# Patient Record
Sex: FEMALE | Race: WHITE | NOT HISPANIC OR LATINO
[De-identification: names, ages, dates, MRNs, and addresses within clinical notes are randomized per-mention and may not be internally consistent; named-entity substitution may affect disease eponyms.]

---

## 2018-11-15 ENCOUNTER — RECORD ABSTRACTING (OUTPATIENT)
Age: 43
End: 2018-11-15

## 2018-11-15 DIAGNOSIS — E06.3 OTHER SPECIFIED HYPOTHYROIDISM: ICD-10-CM

## 2018-11-15 DIAGNOSIS — R14.0 ABDOMINAL DISTENSION (GASEOUS): ICD-10-CM

## 2018-11-15 DIAGNOSIS — Z82.49 FAMILY HISTORY OF ISCHEMIC HEART DISEASE AND OTHER DISEASES OF THE CIRCULATORY SYSTEM: ICD-10-CM

## 2018-11-15 DIAGNOSIS — Z86.19 PERSONAL HISTORY OF OTHER INFECTIOUS AND PARASITIC DISEASES: ICD-10-CM

## 2018-11-15 DIAGNOSIS — F41.8 OTHER SPECIFIED ANXIETY DISORDERS: ICD-10-CM

## 2018-11-15 DIAGNOSIS — E03.8 OTHER SPECIFIED HYPOTHYROIDISM: ICD-10-CM

## 2018-11-15 DIAGNOSIS — G89.29 OTHER CHRONIC PAIN: ICD-10-CM

## 2018-11-15 DIAGNOSIS — G56.00 CARPAL TUNNEL SYNDROME, UNSPECIFIED UPPER LIMB: ICD-10-CM

## 2018-11-15 DIAGNOSIS — Z87.891 PERSONAL HISTORY OF NICOTINE DEPENDENCE: ICD-10-CM

## 2018-11-15 DIAGNOSIS — Z87.39 PERSONAL HISTORY OF OTHER DISEASES OF THE MUSCULOSKELETAL SYSTEM AND CONNECTIVE TISSUE: ICD-10-CM

## 2018-11-15 DIAGNOSIS — Z80.9 FAMILY HISTORY OF MALIGNANT NEOPLASM, UNSPECIFIED: ICD-10-CM

## 2018-11-15 DIAGNOSIS — G43.009 MIGRAINE W/OUT AURA, NOT INTRACTABLE, W/OUT STATUS MIGRAINOSUS: ICD-10-CM

## 2018-11-15 DIAGNOSIS — Z78.9 OTHER SPECIFIED HEALTH STATUS: ICD-10-CM

## 2018-11-15 DIAGNOSIS — M54.40 LUMBAGO WITH SCIATICA, UNSPECIFIED SIDE: ICD-10-CM

## 2018-11-15 DIAGNOSIS — Z81.8 FAMILY HISTORY OF OTHER MENTAL AND BEHAVIORAL DISORDERS: ICD-10-CM

## 2018-11-15 DIAGNOSIS — Z83.3 FAMILY HISTORY OF DIABETES MELLITUS: ICD-10-CM

## 2018-12-13 ENCOUNTER — APPOINTMENT (OUTPATIENT)
Dept: ENDOCRINOLOGY | Facility: CLINIC | Age: 43
End: 2018-12-13

## 2019-02-19 ENCOUNTER — APPOINTMENT (OUTPATIENT)
Dept: INTERNAL MEDICINE | Facility: CLINIC | Age: 44
End: 2019-02-19
Payer: COMMERCIAL

## 2019-02-19 VITALS — BODY MASS INDEX: 39.05 KG/M2 | HEIGHT: 66 IN | WEIGHT: 243 LBS | TEMPERATURE: 98 F

## 2019-02-19 PROCEDURE — 99213 OFFICE O/P EST LOW 20 MIN: CPT

## 2019-02-20 VITALS — DIASTOLIC BLOOD PRESSURE: 70 MMHG | SYSTOLIC BLOOD PRESSURE: 120 MMHG

## 2019-02-20 NOTE — HISTORY OF PRESENT ILLNESS
[FreeTextEntry8] : Patient is a 43 F with a hx of hashimoto's thyroiditis, last seen by Dr. Prasad 8/2018, thyroid nodules, lumbar facet arthropathy presents today due to facial swelling. Reports that she was having dental work done yesterday and while the dentist was drilling she started to have acute pain followed by facial and eye swelling. She was placed on Benadryl which patient reports has helped the swelling. She is not itchy. There is no fever. \par She reports feeling popping under the skin when she touches the side of her face and neck.

## 2019-02-20 NOTE — PHYSICAL EXAM
[No Acute Distress] : no acute distress [Well Nourished] : well nourished [Well Developed] : well developed [No Respiratory Distress] : no respiratory distress  [Clear to Auscultation] : lungs were clear to auscultation bilaterally [No Accessory Muscle Use] : no accessory muscle use [Normal Rate] : normal rate  [Regular Rhythm] : with a regular rhythm [Normal S1, S2] : normal S1 and S2 [No Murmur] : no murmur heard [de-identified] : right eye with periorbital swelling [de-identified] : right facial swelling. There is popping on palpation of the cheek and neck on the right side

## 2019-03-28 ENCOUNTER — INBOUND DOCUMENT (OUTPATIENT)
Age: 44
End: 2019-03-28

## 2019-07-30 ENCOUNTER — APPOINTMENT (OUTPATIENT)
Dept: INTERNAL MEDICINE | Facility: CLINIC | Age: 44
End: 2019-07-30
Payer: COMMERCIAL

## 2019-07-30 VITALS — TEMPERATURE: 98 F | HEIGHT: 66 IN | WEIGHT: 248 LBS | BODY MASS INDEX: 39.86 KG/M2

## 2019-07-30 PROCEDURE — 99214 OFFICE O/P EST MOD 30 MIN: CPT

## 2019-07-31 VITALS — SYSTOLIC BLOOD PRESSURE: 140 MMHG | DIASTOLIC BLOOD PRESSURE: 90 MMHG

## 2019-07-31 NOTE — HISTORY OF PRESENT ILLNESS
[FreeTextEntry1] : headache [de-identified] : Keyon is a 43F with a hx of HTN, hypothyroidism, Migraines, thyroid nodule, presents today with headache x 2 weeks. \chaitanya Describes the pain as shooting and goes from the back of the head to the front on the right side\chaitanya Saw her dentist 2 weeks who did xrays and does not feel it is dental\chaitanya Has a hx of migraines that started at 37 yo and then resolved for the last several years  -  per patient MRI brain had inflammation seen by neurologist who said she was ok.\par Currently her pain is moderate to severe- the shooting is for a few seconds, but the underlying pain can remain all day. \par There is no nausea or vomiting. No photo/phono sensitivity.\par NSAIDs are not currently helping. \par \par \par

## 2019-07-31 NOTE — REVIEW OF SYSTEMS
[Headache] : headache [Negative] : Heme/Lymph [Vision Problems] : no vision problems [Dizziness] : no dizziness [Confusion] : no confusion

## 2019-08-09 ENCOUNTER — OTHER (OUTPATIENT)
Age: 44
End: 2019-08-09

## 2019-08-13 ENCOUNTER — OTHER (OUTPATIENT)
Age: 44
End: 2019-08-13

## 2019-08-13 DIAGNOSIS — R51 HEADACHE: ICD-10-CM

## 2019-08-14 ENCOUNTER — APPOINTMENT (OUTPATIENT)
Dept: INTERNAL MEDICINE | Facility: CLINIC | Age: 44
End: 2019-08-14
Payer: COMMERCIAL

## 2019-08-14 VITALS — SYSTOLIC BLOOD PRESSURE: 120 MMHG | DIASTOLIC BLOOD PRESSURE: 70 MMHG

## 2019-08-14 VITALS — TEMPERATURE: 98 F | WEIGHT: 248 LBS | BODY MASS INDEX: 39.86 KG/M2 | HEIGHT: 66 IN

## 2019-08-14 PROCEDURE — 99214 OFFICE O/P EST MOD 30 MIN: CPT

## 2019-08-14 NOTE — HISTORY OF PRESENT ILLNESS
[de-identified] : Keyon is a 43F with a hx of HTN, hypothyroidism, Migraines, thyroid nodule, presents today for hypertension follow up.\chaitanya Teague has completed the ambulatory BP monitoring and was found to be Stage 2 hypertensive. I put her on Lisinopril 10mg daily. \chaitanya Feeling well without side effects. headaches are still present [FreeTextEntry1] : follow up

## 2019-08-18 ENCOUNTER — RESULT REVIEW (OUTPATIENT)
Age: 44
End: 2019-08-18

## 2019-08-19 ENCOUNTER — RX RENEWAL (OUTPATIENT)
Age: 44
End: 2019-08-19

## 2019-08-20 ENCOUNTER — APPOINTMENT (OUTPATIENT)
Dept: INTERNAL MEDICINE | Facility: CLINIC | Age: 44
End: 2019-08-20

## 2019-08-27 ENCOUNTER — RX RENEWAL (OUTPATIENT)
Age: 44
End: 2019-08-27

## 2019-11-07 ENCOUNTER — APPOINTMENT (OUTPATIENT)
Dept: ENDOCRINOLOGY | Facility: CLINIC | Age: 44
End: 2019-11-07
Payer: COMMERCIAL

## 2019-11-07 VITALS
SYSTOLIC BLOOD PRESSURE: 110 MMHG | OXYGEN SATURATION: 98 % | WEIGHT: 246 LBS | BODY MASS INDEX: 39.53 KG/M2 | HEIGHT: 66 IN | HEART RATE: 60 BPM | DIASTOLIC BLOOD PRESSURE: 80 MMHG

## 2019-11-07 LAB
25(OH)D3 SERPL-MCNC: 20.4 NG/ML
BASOPHILS # BLD AUTO: 0.04 K/UL
BASOPHILS NFR BLD AUTO: 0.6 %
EOSINOPHIL # BLD AUTO: 0.07 K/UL
EOSINOPHIL NFR BLD AUTO: 1.1 %
HCT VFR BLD CALC: 41.3 %
HGB BLD-MCNC: 13.2 G/DL
IMM GRANULOCYTES NFR BLD AUTO: 0.2 %
LYMPHOCYTES # BLD AUTO: 2.11 K/UL
LYMPHOCYTES NFR BLD AUTO: 34.3 %
MAN DIFF?: NORMAL
MCHC RBC-ENTMCNC: 27.3 PG
MCHC RBC-ENTMCNC: 32 GM/DL
MCV RBC AUTO: 85.3 FL
MONOCYTES # BLD AUTO: 0.58 K/UL
MONOCYTES NFR BLD AUTO: 9.4 %
NEUTROPHILS # BLD AUTO: 3.35 K/UL
NEUTROPHILS NFR BLD AUTO: 54.4 %
PLATELET # BLD AUTO: 369 K/UL
RBC # BLD: 4.84 M/UL
RBC # FLD: 15.2 %
T3 SERPL-MCNC: 154 NG/DL
T4 FREE SERPL-MCNC: 1 NG/DL
TSH SERPL-ACNC: 1.63 UIU/ML
VIT B12 SERPL-MCNC: 359 PG/ML
WBC # FLD AUTO: 6.16 K/UL

## 2019-11-07 PROCEDURE — 99214 OFFICE O/P EST MOD 30 MIN: CPT | Mod: 25

## 2019-11-07 RX ORDER — NALTREXONE HYDROCHLORIDE AND BUPROPION HYDROCHLORIDE 8; 90 MG/1; MG/1
8-90 TABLET, EXTENDED RELEASE ORAL
Refills: 0 | Status: DISCONTINUED | COMMUNITY
End: 2019-11-07

## 2019-11-07 RX ORDER — CYCLOBENZAPRINE HYDROCHLORIDE 10 MG/1
10 TABLET, FILM COATED ORAL
Refills: 0 | Status: DISCONTINUED | COMMUNITY
End: 2019-11-07

## 2019-11-07 RX ORDER — FLUTICASONE PROPIONATE 50 UG/1
50 SPRAY, METERED NASAL TWICE DAILY
Refills: 0 | Status: DISCONTINUED | COMMUNITY
End: 2019-11-07

## 2019-11-07 RX ORDER — LIRAGLUTIDE 6 MG/ML
18 INJECTION, SOLUTION SUBCUTANEOUS
Refills: 0 | Status: DISCONTINUED | COMMUNITY
End: 2019-11-07

## 2019-11-07 RX ORDER — NAPROXEN 500 MG/1
500 TABLET ORAL
Qty: 30 | Refills: 0 | Status: DISCONTINUED | COMMUNITY
Start: 2019-02-11 | End: 2019-11-07

## 2019-11-07 RX ORDER — CHROMIUM 200 MCG
TABLET ORAL
Refills: 0 | Status: DISCONTINUED | COMMUNITY
End: 2019-11-07

## 2019-11-07 RX ORDER — LIRAGLUTIDE 6 MG/ML
18 INJECTION SUBCUTANEOUS
Refills: 0 | Status: DISCONTINUED | COMMUNITY
End: 2019-11-07

## 2019-11-07 RX ORDER — ERGOCALCIFEROL 1.25 MG/1
1.25 MG CAPSULE ORAL
Refills: 0 | Status: DISCONTINUED | COMMUNITY
End: 2019-11-07

## 2019-11-07 RX ORDER — SUMATRIPTAN SUCCINATE 100 MG/1
100 TABLET, FILM COATED ORAL
Refills: 0 | Status: DISCONTINUED | COMMUNITY
End: 2019-11-07

## 2019-11-07 RX ORDER — TOBRAMYCIN 3 MG/ML
0.3 SOLUTION/ DROPS OPHTHALMIC
Qty: 5 | Refills: 0 | Status: DISCONTINUED | COMMUNITY
Start: 2019-02-10 | End: 2019-11-07

## 2019-11-07 NOTE — PHYSICAL EXAM
[No Acute Distress] : no acute distress [Alert] : alert [Well Nourished] : well nourished [Well Developed] : well developed [Normal Sclera/Conjunctiva] : normal sclera/conjunctiva [EOMI] : extra ocular movement intact [Normal Oropharynx] : the oropharynx was normal [No Proptosis] : no proptosis [No Respiratory Distress] : no respiratory distress [No Thyroid Nodules] : there were no palpable thyroid nodules [Clear to Auscultation] : lungs were clear to auscultation bilaterally [No Accessory Muscle Use] : no accessory muscle use [Normal Rate] : heart rate was normal  [Regular Rhythm] : with a regular rhythm [Normal S1, S2] : normal S1 and S2 [Pedal Pulses Normal] : the pedal pulses are present [Normal Bowel Sounds] : normal bowel sounds [No Edema] : there was no peripheral edema [Not Tender] : non-tender [Soft] : abdomen soft [Not Distended] : not distended [Post Cervical Nodes] : posterior cervical nodes [Anterior Cervical Nodes] : anterior cervical nodes [Normal] : normal and non tender [Axillary Nodes] : axillary nodes [No Spinal Tenderness] : no spinal tenderness [Spine Straight] : spine straight [Normal Gait] : normal gait [No Stigmata of Cushings Syndrome] : no stigmata of cushings syndrome [Normal Strength/Tone] : muscle strength and tone were normal [No Rash] : no rash [Normal Reflexes] : deep tendon reflexes were 2+ and symmetric [No Tremors] : no tremors [Oriented x3] : oriented to person, place, and time [de-identified] : mild thyromegaly [Acanthosis Nigricans] : no acanthosis nigricans

## 2019-11-07 NOTE — PHYSICAL EXAM
[Alert] : alert [No Acute Distress] : no acute distress [Well Developed] : well developed [Well Nourished] : well nourished [EOMI] : extra ocular movement intact [Normal Sclera/Conjunctiva] : normal sclera/conjunctiva [Normal Oropharynx] : the oropharynx was normal [No Proptosis] : no proptosis [No Thyroid Nodules] : there were no palpable thyroid nodules [No Respiratory Distress] : no respiratory distress [Normal Rate] : heart rate was normal  [Clear to Auscultation] : lungs were clear to auscultation bilaterally [No Accessory Muscle Use] : no accessory muscle use [Normal S1, S2] : normal S1 and S2 [Regular Rhythm] : with a regular rhythm [No Edema] : there was no peripheral edema [Normal Bowel Sounds] : normal bowel sounds [Pedal Pulses Normal] : the pedal pulses are present [Not Tender] : non-tender [Soft] : abdomen soft [Not Distended] : not distended [Post Cervical Nodes] : posterior cervical nodes [Anterior Cervical Nodes] : anterior cervical nodes [No Spinal Tenderness] : no spinal tenderness [Normal] : normal and non tender [Axillary Nodes] : axillary nodes [Normal Gait] : normal gait [Spine Straight] : spine straight [No Stigmata of Cushings Syndrome] : no stigmata of cushings syndrome [Normal Strength/Tone] : muscle strength and tone were normal [No Rash] : no rash [No Tremors] : no tremors [Normal Reflexes] : deep tendon reflexes were 2+ and symmetric [Oriented x3] : oriented to person, place, and time [Acanthosis Nigricans] : no acanthosis nigricans [de-identified] : mild thyromegaly

## 2019-11-07 NOTE — HISTORY OF PRESENT ILLNESS
[FreeTextEntry1] :   Endo- Dr. Douglas ([facility])-4-5 yrs\par        41 yo WW f/u for hypothyroidism, Hashimoto thyroiditis, thyroid nodules, did not do her US yet\par        "I have been dieting since I was a kid" , seen Nutritionist "I know what to do" \par        has been on Synthroid LINDA 100mcg qd takes it correctly , more energy since [place] 75mg qd , dose increased 8/17\par        denies Fhx thyroid cancer\par        denies neck irradiation\par        denies dysphagia, \par        denies dyspnea\par        denies dysphonia \par        7/6/17 right thyroid nodule hypoechoic 1cm FNA negative for malignancy \par        Dr Horton tried to get for her Victoza, Saxenda, eather covered by her insurance unless she develops type 2 DM2 and she fails Metformin.

## 2019-11-07 NOTE — HISTORY OF PRESENT ILLNESS
[FreeTextEntry1] :   Endo- Dr. Douglas ([facility])-4-5 yrs\par        41 yo WW f/u for hypothyroidism, Hashimoto thyroiditis, thyroid nodules, did not do her US yet\par        "I have been dieting since I was a kid" , seen Nutritionist "I know what to do" \par        has been on Synthroid LINDA 100mcg qd takes it correctly , more energy since [place] 75mg qd , dose increased 8/17\par        denies Fhx thyroid cancer\par        denies neck irradiation\par        denies dysphagia, \par        denies dyspnea\par        denies dysphonia \par        7/6/17 right thyroid nodule hypoechoic 1cm FNA negative for malignancy \par        Dr Horton tried to get for her Victoza, Saxenda, eather covered by her insurance unless she develops type 2 DM2 and she fails Metformin.

## 2019-11-07 NOTE — REVIEW OF SYSTEMS
[Fatigue] : fatigue [Gas/Bloating] : gas/bloating [Joint Pain] : joint pain [Constipation] : constipation [Depression] : depression [Back Pain] : back pain [Headache] : headaches [Anxiety] : anxiety [Stress] : stress [Insomnia] : insomnia [Negative] : Heme/Lymph

## 2020-03-02 ENCOUNTER — APPOINTMENT (OUTPATIENT)
Dept: INTERNAL MEDICINE | Facility: CLINIC | Age: 45
End: 2020-03-02
Payer: COMMERCIAL

## 2020-03-02 VITALS — BODY MASS INDEX: 40.18 KG/M2 | TEMPERATURE: 98 F | WEIGHT: 250 LBS | HEIGHT: 66 IN

## 2020-03-02 VITALS — DIASTOLIC BLOOD PRESSURE: 60 MMHG | SYSTOLIC BLOOD PRESSURE: 108 MMHG

## 2020-03-02 PROCEDURE — 36415 COLL VENOUS BLD VENIPUNCTURE: CPT

## 2020-03-02 PROCEDURE — 99396 PREV VISIT EST AGE 40-64: CPT | Mod: 25

## 2020-03-02 NOTE — PHYSICAL EXAM
[No Acute Distress] : no acute distress [Well-Appearing] : well-appearing [Well Nourished] : well nourished [Well Developed] : well developed [Normal Sclera/Conjunctiva] : normal sclera/conjunctiva [PERRL] : pupils equal round and reactive to light [EOMI] : extraocular movements intact [Normal Oropharynx] : the oropharynx was normal [No Lymphadenopathy] : no lymphadenopathy [Normal Outer Ear/Nose] : the outer ears and nose were normal in appearance [No Respiratory Distress] : no respiratory distress  [Supple] : supple [Thyroid Normal, No Nodules] : the thyroid was normal and there were no nodules present [Clear to Auscultation] : lungs were clear to auscultation bilaterally [No Accessory Muscle Use] : no accessory muscle use [Normal Rate] : normal rate  [Regular Rhythm] : with a regular rhythm [Normal S1, S2] : normal S1 and S2 [No Abdominal Bruit] : a ~M bruit was not heard ~T in the abdomen [No Murmur] : no murmur heard [No Varicosities] : no varicosities [Pedal Pulses Present] : the pedal pulses are present [No Edema] : there was no peripheral edema [No Palpable Aorta] : no palpable aorta [Non Tender] : non-tender [No Extremity Clubbing/Cyanosis] : no extremity clubbing/cyanosis [Soft] : abdomen soft [No HSM] : no HSM [Non-distended] : non-distended [No Masses] : no abdominal mass palpated [Normal Posterior Cervical Nodes] : no posterior cervical lymphadenopathy [Normal Bowel Sounds] : normal bowel sounds [Normal Anterior Cervical Nodes] : no anterior cervical lymphadenopathy [Grossly Normal Strength/Tone] : grossly normal strength/tone [No Joint Swelling] : no joint swelling [No Focal Deficits] : no focal deficits [Coordination Grossly Intact] : coordination grossly intact [No Rash] : no rash [Normal Affect] : the affect was normal [Normal Gait] : normal gait [Normal Insight/Judgement] : insight and judgment were intact [de-identified] : limited internal and external rotation of the hips worse on the left

## 2020-03-02 NOTE — HEALTH RISK ASSESSMENT
[30 or more] : 30 or more [Yes] : Yes [2 - 3 times a week (3 pts)] : 2 - 3  times a week (3 points) [1 or 2 (0 pts)] : 1 or 2 (0 points) [Never (0 pts)] : Never (0 points) [No] : In the past 12 months have you used drugs other than those required for medical reasons? No [No falls in past year] : Patient reported no falls in the past year [0] : 1) Little interest or pleasure doing things: Not at all (0) [1] : 2) Feeling down, depressed, or hopeless for several days (1) [HIV Test offered] : HIV Test offered [Hepatitis C test offered] : Hepatitis C test offered [With Family] : lives with family [# of Members in Household ___] :  household currently consist of [unfilled] member(s) [Employed] : employed [Graduate School] : graduate school [] :  [Feels Safe at Home] : Feels safe at home [# Of Children ___] : has [unfilled] children [Reports changes in vision] : Reports changes in vision [Reports normal functional visual acuity (ie: able to read med bottle)] : Reports normal functional visual acuity [Smoke Detector] : smoke detector [Carbon Monoxide Detector] : carbon monoxide detector [Guns at Home] : guns at home [Seat Belt] :  uses seat belt [Sunscreen] : uses sunscreen [Patient/Caregiver not ready to engage] : Patient/Caregiver not ready to engage [Patient reported mammogram was normal] : Patient reported mammogram was normal [Patient reported PAP Smear was normal] : Patient reported PAP Smear was normal [] : No [YearQuit] : 2005 [Audit-CScore] : 3 [de-identified] : socially  [[id number]] : 1 [de-identified] : not at time w/ hip pain [de-identified] : Pt. 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

## 2020-03-02 NOTE — HISTORY OF PRESENT ILLNESS
[de-identified] : Patiient is a 43F with a hx of HTN, hypothyroidism, Migraines, thyroid nodule, presents today for annual exam\par Getting dry cough with lisinopril\par Has arthritis of left hip - worse for the last year, difficulty walking. Also with left knee and lower back pain

## 2020-03-06 LAB
25(OH)D3 SERPL-MCNC: 28.9 NG/ML
ALBUMIN SERPL ELPH-MCNC: 4.1 G/DL
ALP BLD-CCNC: 64 U/L
ALT SERPL-CCNC: 20 U/L
ANION GAP SERPL CALC-SCNC: 18 MMOL/L
AST SERPL-CCNC: 19 U/L
BASOPHILS # BLD AUTO: 0.05 K/UL
BASOPHILS NFR BLD AUTO: 0.8 %
BILIRUB SERPL-MCNC: 0.3 MG/DL
BUN SERPL-MCNC: 11 MG/DL
CALCIUM SERPL-MCNC: 9.6 MG/DL
CHLORIDE SERPL-SCNC: 102 MMOL/L
CHOLEST SERPL-MCNC: 218 MG/DL
CHOLEST/HDLC SERPL: 4.3 RATIO
CO2 SERPL-SCNC: 20 MMOL/L
CREAT SERPL-MCNC: 0.75 MG/DL
EOSINOPHIL # BLD AUTO: 0.08 K/UL
EOSINOPHIL NFR BLD AUTO: 1.3 %
ESTIMATED AVERAGE GLUCOSE: 114 MG/DL
GLUCOSE SERPL-MCNC: 89 MG/DL
HBA1C MFR BLD HPLC: 5.6 %
HCT VFR BLD CALC: 40.9 %
HCV AB SER QL: NONREACTIVE
HCV S/CO RATIO: 0.42 S/CO
HDLC SERPL-MCNC: 50 MG/DL
HGB BLD-MCNC: 12.9 G/DL
HIV1+2 AB SPEC QL IA.RAPID: NONREACTIVE
IMM GRANULOCYTES NFR BLD AUTO: 0.2 %
LDLC SERPL CALC-MCNC: 137 MG/DL
LYMPHOCYTES # BLD AUTO: 1.94 K/UL
LYMPHOCYTES NFR BLD AUTO: 31.9 %
MAN DIFF?: NORMAL
MCHC RBC-ENTMCNC: 27.6 PG
MCHC RBC-ENTMCNC: 31.5 GM/DL
MCV RBC AUTO: 87.4 FL
MONOCYTES # BLD AUTO: 0.6 K/UL
MONOCYTES NFR BLD AUTO: 9.9 %
NEUTROPHILS # BLD AUTO: 3.41 K/UL
NEUTROPHILS NFR BLD AUTO: 55.9 %
PLATELET # BLD AUTO: 338 K/UL
POTASSIUM SERPL-SCNC: 4.5 MMOL/L
PROT SERPL-MCNC: 7.1 G/DL
RBC # BLD: 4.68 M/UL
RBC # FLD: 14.8 %
SODIUM SERPL-SCNC: 140 MMOL/L
TRIGL SERPL-MCNC: 152 MG/DL
TSH SERPL-ACNC: 3.56 UIU/ML
WBC # FLD AUTO: 6.09 K/UL

## 2020-03-18 ENCOUNTER — APPOINTMENT (OUTPATIENT)
Dept: OBGYN | Facility: CLINIC | Age: 45
End: 2020-03-18

## 2020-04-06 ENCOUNTER — APPOINTMENT (OUTPATIENT)
Dept: INTERNAL MEDICINE | Facility: CLINIC | Age: 45
End: 2020-04-06
Payer: COMMERCIAL

## 2020-04-06 VITALS — SYSTOLIC BLOOD PRESSURE: 140 MMHG | DIASTOLIC BLOOD PRESSURE: 80 MMHG

## 2020-04-06 PROCEDURE — 99213 OFFICE O/P EST LOW 20 MIN: CPT

## 2020-04-06 RX ORDER — HYDROCODONE BITARTRATE AND ACETAMINOPHEN 7.5; 3 MG/1; MG/1
7.5-3 TABLET ORAL
Qty: 16 | Refills: 0 | Status: DISCONTINUED | COMMUNITY
Start: 2019-10-17

## 2020-04-06 RX ORDER — AMOXICILLIN AND CLAVULANATE POTASSIUM 500; 125 MG/1; MG/1
500-125 TABLET, FILM COATED ORAL
Qty: 30 | Refills: 0 | Status: DISCONTINUED | COMMUNITY
Start: 2019-10-14

## 2020-04-06 NOTE — HISTORY OF PRESENT ILLNESS
[FreeTextEntry1] : BP follow up [de-identified] : Patiient is a 43F with a hx of HTN, hypothyroidism, Migraines, thyroid nodule, presents today for annual exam\par now on Losartan 25mg\par Getting pressure headaches\par Does not have BP cuff to take BP at home

## 2020-04-06 NOTE — PHYSICAL EXAM
[Normal Sclera/Conjunctiva] : normal sclera/conjunctiva [No Edema] : there was no peripheral edema [No Rash] : no rash [Normal] : affect was normal and insight and judgment were intact

## 2020-04-15 ENCOUNTER — RX RENEWAL (OUTPATIENT)
Age: 45
End: 2020-04-15

## 2020-06-04 ENCOUNTER — RX RENEWAL (OUTPATIENT)
Age: 45
End: 2020-06-04

## 2020-06-25 ENCOUNTER — APPOINTMENT (OUTPATIENT)
Dept: ENDOCRINOLOGY | Facility: CLINIC | Age: 45
End: 2020-06-25
Payer: COMMERCIAL

## 2020-06-25 VITALS — BODY MASS INDEX: 38.41 KG/M2 | HEIGHT: 66 IN | WEIGHT: 239 LBS

## 2020-06-25 LAB
25(OH)D3 SERPL-MCNC: 27.3 NG/ML
T3 SERPL-MCNC: 158 NG/DL
T4 FREE SERPL-MCNC: 0.8 NG/DL
TSH SERPL-ACNC: 1.87 UIU/ML

## 2020-06-25 PROCEDURE — 99214 OFFICE O/P EST MOD 30 MIN: CPT | Mod: 95

## 2020-06-25 RX ORDER — LISINOPRIL 10 MG/1
10 TABLET ORAL DAILY
Qty: 90 | Refills: 1 | Status: DISCONTINUED | COMMUNITY
Start: 2019-08-09 | End: 2020-06-25

## 2020-06-25 NOTE — HISTORY OF PRESENT ILLNESS
[Home] : at home, [unfilled] , at the time of the visit. [Verbal consent obtained from patient] : the patient, [unfilled] [Medical Office: ([facility])___] : at the medical office located in  [FreeTextEntry1] :   Endo- Dr. Douglas ([facility])-4-5 yrs\par        45 yo WW f/u for hypothyroidism, Hashimoto thyroiditis, thyroid nodules, did not do her US yet this year, last one \par  her mother  few month ago \par        has been on Synthroid LINDA 100mcg qd takes it correctly , more energy since [place] 90mg qd labs reviewed good \par        denies Fhx thyroid cancer\par        denies neck irradiation\par        denies dysphagia, \par        denies dyspnea\par        denies dysphonia \par        17 right thyroid nodule hypoechoic 1cm FNA negative for malignancy \par        Dr Horton tried to get for her Victoza, Saxenda, either covered by her insurance unless she develops type 2 DM2 and she fails Metformin.\par \par started weight watchers doing well loosing weight

## 2020-06-25 NOTE — REVIEW OF SYSTEMS
[FreeTextEntry1] : Constitutional: fatigue. weight loss on weight watchers 11lb \par Gastrointestinal: constipation and gas/bloating. \par Musculoskeletal: joint pain and back pain. \par Neurological: headaches. \par Psychiatric: depression, anxiety, insomnia and stress. \par Constitutional, Eyes, ENT, Cardiovascular, Respiratory, Gastrointestinal, Genitourinary, Musculoskeletal, Integumentary, Neurological, Psychiatric, Endocrine and Heme/Lymph are otherwise negative.

## 2020-07-13 ENCOUNTER — RX RENEWAL (OUTPATIENT)
Age: 45
End: 2020-07-13

## 2020-07-15 ENCOUNTER — APPOINTMENT (OUTPATIENT)
Dept: OBGYN | Facility: CLINIC | Age: 45
End: 2020-07-15
Payer: COMMERCIAL

## 2020-07-15 VITALS
DIASTOLIC BLOOD PRESSURE: 82 MMHG | SYSTOLIC BLOOD PRESSURE: 116 MMHG | TEMPERATURE: 98.4 F | WEIGHT: 239 LBS | BODY MASS INDEX: 38.41 KG/M2 | HEIGHT: 66 IN | HEART RATE: 76 BPM | OXYGEN SATURATION: 99 %

## 2020-07-15 DIAGNOSIS — Z01.419 ENCOUNTER FOR GYNECOLOGICAL EXAMINATION (GENERAL) (ROUTINE) W/OUT ABNORMAL FINDINGS: ICD-10-CM

## 2020-07-15 DIAGNOSIS — Z12.4 ENCOUNTER FOR SCREENING FOR MALIGNANT NEOPLASM OF CERVIX: ICD-10-CM

## 2020-07-15 DIAGNOSIS — Z12.39 ENCOUNTER FOR OTHER SCREENING FOR MALIGNANT NEOPLASM OF BREAST: ICD-10-CM

## 2020-07-15 PROCEDURE — 99396 PREV VISIT EST AGE 40-64: CPT

## 2020-07-15 NOTE — PHYSICAL EXAM
[Awake] : awake [Acute Distress] : no acute distress [Alert] : alert [LAD] : no lymphadenopathy [Goiter] : no goiter [Mass] : no breast mass [Thyroid Nodule] : no thyroid nodule [Nipple Discharge] : no nipple discharge [Tender] : non tender [Axillary LAD] : no axillary lymphadenopathy [H/Smegaly] : no hepatosplenomegaly [Oriented x3] : oriented to person, place, and time [Distended] : not distended [No Lesions] : no genitalia lesions [Depressed Mood] : not depressed [Flat Affect] : affect not flat [Labia Minora Erythema] : no erythema of the labia minora [Labia Majora Erythema] : no erythema of the labia majora [Labia Minora] : labia minora [Normal] : uterus [Labia Majora] : labia major [Pink Rugae] : pink rugae [Cystocele] : a cystocele [No Bleeding] : there was no active vaginal bleeding [Pap Obtained] : a Pap smear was not performed [Motion Tenderness] : there was no cervical motion tenderness [Soft] :  the cervix was not soft [Tenderness] : nontender [Normal Position] : in a normal position [Mass ___ cm] : no uterine mass was palpated [Uterine Adnexae] : were not tender and not enlarged [Adnexa Tenderness] : were not tender [Enlarged ___ wks] : not enlarged [FreeTextEntry9] : deferred

## 2020-07-15 NOTE — REVIEW OF SYSTEMS
[Feeling Tired] : not feeling tired [Chills] : no chills [Chest Pain] : no chest pain [Palpitations] : no palpitations [Dyspnea] : no shortness of breath [Constipation] : no constipation [Abdominal Pain] : no abdominal pain [Cough] : no cough [Diarrhea] : no diarrhea [Pelvic Pain] : no pelvic pain [Dysuria] : no dysuria [Incontinence] : incontinence [Urgency] : no urgency [Increased Time Between Periods] : increased time between periods [Frequency] : no frequency [Arthralgias] : no arthralgias [Joint Pain] : no joint pain [Skin Lesions] : no skin lesions [Breast Pain] : no breast pain [Joint Stiffness] : no joint stiffness [Dizziness] : no dizziness [Headache] : no headache [Hot Flashes] : hot flashes [Deepening Voice] : no deepening of the voice [Feeling Weak] : no feelings of weakness [Nl] : Integumentary

## 2020-07-15 NOTE — DISCUSSION/SUMMARY
[FreeTextEntry1] : Annual exam\par pap with HR HPV\par dense breasts bilaterally - mammo and breast ultrasound for screening\par \par Stress incontinence\par REviewed options ranging from Pelvic Pt to surgical intervention\par Plan is for pelvic PT\par \par Answered all questions\par \par Meredith Valerio MD, PhD\par

## 2020-07-17 LAB — HPV HIGH+LOW RISK DNA PNL CVX: NOT DETECTED

## 2020-07-21 ENCOUNTER — RESULT REVIEW (OUTPATIENT)
Age: 45
End: 2020-07-21

## 2020-07-21 LAB — CYTOLOGY CVX/VAG DOC THIN PREP: NORMAL

## 2020-08-17 ENCOUNTER — RX RENEWAL (OUTPATIENT)
Age: 45
End: 2020-08-17

## 2020-09-29 ENCOUNTER — APPOINTMENT (OUTPATIENT)
Dept: ENDOCRINOLOGY | Facility: CLINIC | Age: 45
End: 2020-09-29
Payer: COMMERCIAL

## 2020-09-29 VITALS — HEIGHT: 66 IN | BODY MASS INDEX: 38.41 KG/M2 | WEIGHT: 239 LBS

## 2020-09-29 LAB
25(OH)D3 SERPL-MCNC: 29.2 NG/ML
T3 SERPL-MCNC: 108 NG/DL
T4 FREE SERPL-MCNC: 0.9 NG/DL
TSH SERPL-ACNC: 1.74 UIU/ML

## 2020-09-29 PROCEDURE — 99214 OFFICE O/P EST MOD 30 MIN: CPT | Mod: 25,95

## 2020-09-29 PROCEDURE — G0447 BEHAVIOR COUNSEL OBESITY 15M: CPT | Mod: 95

## 2020-09-29 RX ORDER — ERGOCALCIFEROL 1.25 MG/1
1.25 MG CAPSULE ORAL
Qty: 13 | Refills: 1 | Status: DISCONTINUED | COMMUNITY
Start: 2019-11-07 | End: 2020-09-29

## 2020-09-29 NOTE — REVIEW OF SYSTEMS
[Fatigue] : fatigue [Joint Pain] : joint pain [Back Pain] : back pain [Headaches] : headaches [Insomnia] : insomnia [Anxiety] : anxiety [Negative] : Heme/Lymph [Depression] : no depression [Stress] : no stress

## 2020-09-29 NOTE — HISTORY OF PRESENT ILLNESS
[Home] : at home, [unfilled] , at the time of the visit. [Medical Office: ([facility])___] : at the medical office located in  [Verbal consent obtained from patient] : the patient, [unfilled] [FreeTextEntry1] : prior   Endo- Dr. Douglas ([facility])-4-5 yrs\par        46 yo WW f/u for hypothyroidism, Hashimoto thyroiditis, thyroid nodules, \par \par        has been on Synthroid LINDA 100mcg qd takes it correctly , more energy since [place] 90mg qd labs reviewed good but still tired \par has had hip pain did not sleep well for the last two nights \par        denies Fhx thyroid cancer\par        denies neck irradiation\par        denies dysphagia, \par        denies dyspnea\par        denies dysphonia \par        7/6/17 right thyroid nodule hypoechoic 1cm FNA negative for malignancy \par        Dr Horton tried to get for her Victoza, Saxenda, either covered by her insurance unless she develops type 2 DM2 and she fails Metformin.\par \par started weight watchers doing well loosing weight

## 2020-10-12 ENCOUNTER — RX RENEWAL (OUTPATIENT)
Age: 45
End: 2020-10-12

## 2020-10-20 ENCOUNTER — RX RENEWAL (OUTPATIENT)
Age: 45
End: 2020-10-20

## 2020-12-23 PROBLEM — Z01.419 ENCOUNTER FOR ANNUAL ROUTINE GYNECOLOGICAL EXAMINATION: Status: RESOLVED | Noted: 2020-07-15 | Resolved: 2020-12-23

## 2021-01-04 ENCOUNTER — APPOINTMENT (OUTPATIENT)
Dept: ENDOCRINOLOGY | Facility: CLINIC | Age: 46
End: 2021-01-04
Payer: COMMERCIAL

## 2021-01-04 VITALS
DIASTOLIC BLOOD PRESSURE: 76 MMHG | HEIGHT: 66 IN | WEIGHT: 244 LBS | BODY MASS INDEX: 39.21 KG/M2 | OXYGEN SATURATION: 98 % | SYSTOLIC BLOOD PRESSURE: 112 MMHG | TEMPERATURE: 97.8 F | HEART RATE: 80 BPM

## 2021-01-04 DIAGNOSIS — R63.5 ABNORMAL WEIGHT GAIN: ICD-10-CM

## 2021-01-04 LAB
25(OH)D3 SERPL-MCNC: 25 NG/ML
FOLATE SERPL-MCNC: 14.2 NG/ML
T3 SERPL-MCNC: 148 NG/DL
T4 FREE SERPL-MCNC: 0.9 NG/DL
TSH SERPL-ACNC: 3.84 UIU/ML
VIT B12 SERPL-MCNC: 279 PG/ML

## 2021-01-04 PROCEDURE — G0447 BEHAVIOR COUNSEL OBESITY 15M: CPT

## 2021-01-04 PROCEDURE — 99214 OFFICE O/P EST MOD 30 MIN: CPT | Mod: 25

## 2021-01-04 PROCEDURE — 99072 ADDL SUPL MATRL&STAF TM PHE: CPT

## 2021-01-04 RX ORDER — LOSARTAN POTASSIUM 25 MG/1
25 TABLET, FILM COATED ORAL
Qty: 30 | Refills: 2 | Status: DISCONTINUED | COMMUNITY
Start: 2020-10-20 | End: 2021-01-04

## 2021-01-04 NOTE — REVIEW OF SYSTEMS
[Fatigue] : fatigue [Joint Pain] : joint pain [Back Pain] : back pain [Headaches] : headaches [Insomnia] : insomnia [Anxiety] : anxiety [Negative] : Heme/Lymph [Recent Weight Gain (___ Lbs)] : recent weight gain: [unfilled] lbs [Depression] : no depression [Stress] : no stress

## 2021-01-04 NOTE — HISTORY OF PRESENT ILLNESS
[FreeTextEntry1] : prior   [facility]\par        44 yo WW f/u for hypothyroidism, Hashimoto thyroiditis, thyroid nodules, \par \par       used to be  on Synthroid LINDA 100mcg qd takes it correctly , more energy since [place] 90mg qd labs reviewed good but still tired \par has had hip pain did not sleep well for the last two nights \par        denies Fhx thyroid cancer\par        denies neck irradiation\par        denies dysphagia, \par        denies dyspnea\par        denies dysphonia \par        7/6/17 right thyroid nodule hypoechoic 1cm FNA negative for malignancy \par        Dr Horton tried to get for her Victoza, Saxenda, either covered by her insurance unless she develops type 2 DM2 and she fails Metformin.\par \par started weight watchers doing well loosing weight

## 2021-01-09 ENCOUNTER — TRANSCRIPTION ENCOUNTER (OUTPATIENT)
Age: 46
End: 2021-01-09

## 2021-01-13 ENCOUNTER — APPOINTMENT (OUTPATIENT)
Dept: FAMILY MEDICINE | Facility: CLINIC | Age: 46
End: 2021-01-13
Payer: COMMERCIAL

## 2021-01-13 VITALS
DIASTOLIC BLOOD PRESSURE: 80 MMHG | WEIGHT: 240 LBS | TEMPERATURE: 97.9 F | BODY MASS INDEX: 38.57 KG/M2 | HEIGHT: 66 IN | SYSTOLIC BLOOD PRESSURE: 120 MMHG

## 2021-01-13 VITALS — DIASTOLIC BLOOD PRESSURE: 82 MMHG | SYSTOLIC BLOOD PRESSURE: 128 MMHG

## 2021-01-13 PROCEDURE — G0442 ANNUAL ALCOHOL SCREEN 15 MIN: CPT

## 2021-01-13 PROCEDURE — 99212 OFFICE O/P EST SF 10 MIN: CPT

## 2021-01-13 PROCEDURE — 99072 ADDL SUPL MATRL&STAF TM PHE: CPT

## 2021-01-13 NOTE — HISTORY OF PRESENT ILLNESS
[FreeTextEntry8] : Ms. RIVAS HERRING is a 45 year old female here today for follow-up on her BP. Wasn't feeling well over the weekend and she checked her BP and it was high. Went to  and they said her BP was also high and was told to f/u with her PCP. \par

## 2021-01-13 NOTE — HEALTH RISK ASSESSMENT
[2 - 4 times a month (2 pts)] : 2-4 times a month (2 points) [1 or 2 (0 pts)] : 1 or 2 (0 points) [Never (0 pts)] : Never (0 points) [No] : In the past 12 months have you used drugs other than those required for medical reasons? No [No falls in past year] : Patient reported no falls in the past year [0] : 2) Feeling down, depressed, or hopeless: Not at all (0) [] : No [Yes] : Yes [Audit-CScore] : 2 [de-identified] : walking [de-identified] : weight watchers

## 2021-01-22 ENCOUNTER — APPOINTMENT (OUTPATIENT)
Dept: ENDOCRINOLOGY | Facility: CLINIC | Age: 46
End: 2021-01-22
Payer: COMMERCIAL

## 2021-01-22 PROCEDURE — 99214 OFFICE O/P EST MOD 30 MIN: CPT | Mod: 95,25

## 2021-01-22 PROCEDURE — G0447 BEHAVIOR COUNSEL OBESITY 15M: CPT | Mod: 95

## 2021-01-27 ENCOUNTER — RX RENEWAL (OUTPATIENT)
Age: 46
End: 2021-01-27

## 2021-02-07 NOTE — REVIEW OF SYSTEMS
[Fatigue] : fatigue [Recent Weight Gain (___ Lbs)] : recent weight gain: [unfilled] lbs [Joint Pain] : joint pain [Back Pain] : back pain [Headaches] : headaches [Insomnia] : insomnia [Anxiety] : anxiety [Negative] : Heme/Lymph [Depression] : no depression [Stress] : no stress

## 2021-02-07 NOTE — HISTORY OF PRESENT ILLNESS
[FreeTextEntry1] : prior   [facility]\par        46 yo WW f/u for hypothyroidism, Hashimoto thyroiditis, thyroid nodules, \par \par       used to be  on Synthroid LINDA 100mcg qd takes it correctly , more energy since [place] 90mg qd labs reviewed good but still tired \par has had hip pain did not sleep well for the last two nights \par        denies Fhx thyroid cancer\par        denies neck irradiation\par        denies dysphagia, \par        denies dyspnea\par        denies dysphonia \par        7/6/17 right thyroid nodule hypoechoic 1cm FNA negative for malignancy \par        Dr Horton tried to get for her Victoza, Saxenda, either covered by her insurance unless she develops type 2 DM2 and she fails Metformin.\par \par started weight watchers doing well loosing weight

## 2021-02-10 ENCOUNTER — RX RENEWAL (OUTPATIENT)
Age: 46
End: 2021-02-10

## 2021-02-26 ENCOUNTER — NON-APPOINTMENT (OUTPATIENT)
Age: 46
End: 2021-02-26

## 2021-02-26 ENCOUNTER — APPOINTMENT (OUTPATIENT)
Dept: RHEUMATOLOGY | Facility: CLINIC | Age: 46
End: 2021-02-26
Payer: COMMERCIAL

## 2021-02-26 VITALS
HEIGHT: 66 IN | TEMPERATURE: 97.8 F | DIASTOLIC BLOOD PRESSURE: 78 MMHG | BODY MASS INDEX: 39.05 KG/M2 | SYSTOLIC BLOOD PRESSURE: 126 MMHG | WEIGHT: 243 LBS

## 2021-02-26 DIAGNOSIS — Z86.19 PERSONAL HISTORY OF OTHER INFECTIOUS AND PARASITIC DISEASES: ICD-10-CM

## 2021-02-26 DIAGNOSIS — T81.82XA: ICD-10-CM

## 2021-02-26 DIAGNOSIS — R22.0 LOCALIZED SWELLING, MASS AND LUMP, HEAD: ICD-10-CM

## 2021-02-26 PROCEDURE — 99072 ADDL SUPL MATRL&STAF TM PHE: CPT

## 2021-02-26 PROCEDURE — 99205 OFFICE O/P NEW HI 60 MIN: CPT

## 2021-02-26 NOTE — REASON FOR VISIT
[Initial Evaluation] : an initial evaluation [FreeTextEntry1] : possible Raynaud's; multiple joint pain

## 2021-02-26 NOTE — PHYSICAL EXAM
[Sclera] : the sclera and conjunctiva were normal [] : no respiratory distress [Auscultation Breath Sounds / Voice Sounds] : lungs were clear to auscultation bilaterally [Cervical Lymph Nodes Enlarged Posterior Bilaterally] : posterior cervical [Cervical Lymph Nodes Enlarged Anterior Bilaterally] : anterior cervical [Motor Tone] : muscle strength and tone were normal [FreeTextEntry1] : There is minimal digital pulp atrophy

## 2021-02-26 NOTE — REVIEW OF SYSTEMS
[Joint Pain] : joint pain [Joint Stiffness] : joint stiffness [Fever] : no fever [Chills] : no chills [Eye Pain] : no eye pain [Red Eyes] : eyes not red [Shortness Of Breath] : no shortness of breath [Cough] : no cough [Abdominal Pain] : no abdominal pain [Diarrhea] : no diarrhea [Dysuria] : no dysuria [Joint Swelling] : no joint swelling [Skin Lesions] : no skin lesions [FreeTextEntry6] : There is no pleuritic C/P

## 2021-03-01 LAB
ALBUMIN SERPL ELPH-MCNC: 4.1 G/DL
ALP BLD-CCNC: 67 U/L
ALT SERPL-CCNC: 34 U/L
ANION GAP SERPL CALC-SCNC: 13 MMOL/L
AST SERPL-CCNC: 26 U/L
BASOPHILS # BLD AUTO: 0.04 K/UL
BASOPHILS NFR BLD AUTO: 0.8 %
BILIRUB SERPL-MCNC: 0.2 MG/DL
BUN SERPL-MCNC: 16 MG/DL
CALCIUM SERPL-MCNC: 9.6 MG/DL
CCP AB SER IA-ACNC: <8 UNITS
CHLORIDE SERPL-SCNC: 101 MMOL/L
CO2 SERPL-SCNC: 23 MMOL/L
CREAT SERPL-MCNC: 0.73 MG/DL
CRP SERPL-MCNC: 0.25 MG/DL
DSDNA AB SER-ACNC: <12 IU/ML
ENA RNP AB SER IA-ACNC: 0.2 AL
ENA SM AB SER IA-ACNC: <0.2 AL
ENA SS-A AB SER IA-ACNC: <0.2 AL
ENA SS-B AB SER IA-ACNC: <0.2 AL
EOSINOPHIL # BLD AUTO: 0.06 K/UL
EOSINOPHIL NFR BLD AUTO: 1.2 %
ERYTHROCYTE [SEDIMENTATION RATE] IN BLOOD BY WESTERGREN METHOD: 44 MM/HR
GLUCOSE SERPL-MCNC: 85 MG/DL
HCT VFR BLD CALC: 41.1 %
HGB BLD-MCNC: 13.2 G/DL
IMM GRANULOCYTES NFR BLD AUTO: 0.2 %
LYMPHOCYTES # BLD AUTO: 2.05 K/UL
LYMPHOCYTES NFR BLD AUTO: 40.2 %
MAN DIFF?: NORMAL
MCHC RBC-ENTMCNC: 27.6 PG
MCHC RBC-ENTMCNC: 32.1 GM/DL
MCV RBC AUTO: 85.8 FL
MONOCYTES # BLD AUTO: 0.48 K/UL
MONOCYTES NFR BLD AUTO: 9.4 %
NEUTROPHILS # BLD AUTO: 2.46 K/UL
NEUTROPHILS NFR BLD AUTO: 48.2 %
PLATELET # BLD AUTO: 282 K/UL
POTASSIUM SERPL-SCNC: 4.3 MMOL/L
PROT SERPL-MCNC: 7.2 G/DL
RBC # BLD: 4.79 M/UL
RBC # FLD: 14 %
RF+CCP IGG SER-IMP: NEGATIVE
RHEUMATOID FACT SER QL: <10 IU/ML
SODIUM SERPL-SCNC: 138 MMOL/L
WBC # FLD AUTO: 5.1 K/UL

## 2021-03-02 LAB — HISTONE AB SER QL: 0.4 UNITS

## 2021-03-04 LAB — ANA SER IF-ACNC: NEGATIVE

## 2021-03-17 ENCOUNTER — APPOINTMENT (OUTPATIENT)
Dept: RHEUMATOLOGY | Facility: CLINIC | Age: 46
End: 2021-03-17
Payer: COMMERCIAL

## 2021-03-17 VITALS
BODY MASS INDEX: 38.89 KG/M2 | SYSTOLIC BLOOD PRESSURE: 118 MMHG | DIASTOLIC BLOOD PRESSURE: 72 MMHG | WEIGHT: 242 LBS | HEIGHT: 66 IN | TEMPERATURE: 98.1 F

## 2021-03-17 DIAGNOSIS — R76.11 NONSPECIFIC REACTION TO TUBERCULIN SKIN TEST W/OUT ACTIVE TUBERCULOSIS: ICD-10-CM

## 2021-03-17 PROCEDURE — 99214 OFFICE O/P EST MOD 30 MIN: CPT

## 2021-03-17 PROCEDURE — 99072 ADDL SUPL MATRL&STAF TM PHE: CPT

## 2021-03-17 RX ORDER — METHYLPREDNISOLONE 4 MG/1
4 TABLET ORAL
Qty: 1 | Refills: 0 | Status: DISCONTINUED | COMMUNITY
Start: 2021-02-26 | End: 2021-03-17

## 2021-03-18 LAB
HBV CORE IGG+IGM SER QL: NONREACTIVE
HCV AB SER QL: NONREACTIVE
HCV S/CO RATIO: 0.11 S/CO

## 2021-03-22 LAB
M TB IFN-G BLD-IMP: NEGATIVE
QUANTIFERON TB PLUS MITOGEN MINUS NIL: 9.81 IU/ML
QUANTIFERON TB PLUS NIL: 0.02 IU/ML
QUANTIFERON TB PLUS TB1 MINUS NIL: 0 IU/ML
QUANTIFERON TB PLUS TB2 MINUS NIL: 0 IU/ML

## 2021-04-14 ENCOUNTER — APPOINTMENT (OUTPATIENT)
Dept: INTERNAL MEDICINE | Facility: CLINIC | Age: 46
End: 2021-04-14

## 2021-05-19 ENCOUNTER — APPOINTMENT (OUTPATIENT)
Dept: INTERNAL MEDICINE | Facility: CLINIC | Age: 46
End: 2021-05-19
Payer: COMMERCIAL

## 2021-05-19 PROCEDURE — 36415 COLL VENOUS BLD VENIPUNCTURE: CPT

## 2021-05-19 PROCEDURE — 99072 ADDL SUPL MATRL&STAF TM PHE: CPT

## 2021-05-20 LAB
ALBUMIN SERPL ELPH-MCNC: 4.1 G/DL
ALP BLD-CCNC: 66 U/L
ALT SERPL-CCNC: 20 U/L
ANION GAP SERPL CALC-SCNC: 11 MMOL/L
AST SERPL-CCNC: 21 U/L
BASOPHILS # BLD AUTO: 0.04 K/UL
BASOPHILS NFR BLD AUTO: 0.7 %
BILIRUB SERPL-MCNC: 0.3 MG/DL
BUN SERPL-MCNC: 10 MG/DL
CALCIUM SERPL-MCNC: 9.7 MG/DL
CHLORIDE SERPL-SCNC: 100 MMOL/L
CO2 SERPL-SCNC: 25 MMOL/L
CREAT SERPL-MCNC: 0.74 MG/DL
EOSINOPHIL # BLD AUTO: 0.06 K/UL
EOSINOPHIL NFR BLD AUTO: 1 %
ERYTHROCYTE [SEDIMENTATION RATE] IN BLOOD BY WESTERGREN METHOD: 42 MM/HR
GLUCOSE SERPL-MCNC: 80 MG/DL
HCT VFR BLD CALC: 42 %
HGB BLD-MCNC: 13.3 G/DL
IMM GRANULOCYTES NFR BLD AUTO: 0.2 %
LYMPHOCYTES # BLD AUTO: 1.97 K/UL
LYMPHOCYTES NFR BLD AUTO: 32 %
MAN DIFF?: NORMAL
MCHC RBC-ENTMCNC: 27.7 PG
MCHC RBC-ENTMCNC: 31.7 GM/DL
MCV RBC AUTO: 87.5 FL
MONOCYTES # BLD AUTO: 0.63 K/UL
MONOCYTES NFR BLD AUTO: 10.2 %
NEUTROPHILS # BLD AUTO: 3.44 K/UL
NEUTROPHILS NFR BLD AUTO: 55.9 %
PLATELET # BLD AUTO: 321 K/UL
POTASSIUM SERPL-SCNC: 4.2 MMOL/L
PROT SERPL-MCNC: 7.3 G/DL
RBC # BLD: 4.8 M/UL
RBC # FLD: 14.6 %
SODIUM SERPL-SCNC: 135 MMOL/L
WBC # FLD AUTO: 6.15 K/UL

## 2021-06-23 ENCOUNTER — APPOINTMENT (OUTPATIENT)
Dept: INTERNAL MEDICINE | Facility: CLINIC | Age: 46
End: 2021-06-23
Payer: COMMERCIAL

## 2021-06-23 PROCEDURE — 36415 COLL VENOUS BLD VENIPUNCTURE: CPT

## 2021-06-23 PROCEDURE — 99072 ADDL SUPL MATRL&STAF TM PHE: CPT

## 2021-06-24 ENCOUNTER — APPOINTMENT (OUTPATIENT)
Dept: RHEUMATOLOGY | Facility: CLINIC | Age: 46
End: 2021-06-24
Payer: COMMERCIAL

## 2021-06-24 ENCOUNTER — RX CHANGE (OUTPATIENT)
Age: 46
End: 2021-06-24

## 2021-06-24 VITALS
BODY MASS INDEX: 36.96 KG/M2 | HEIGHT: 66 IN | HEART RATE: 76 BPM | SYSTOLIC BLOOD PRESSURE: 118 MMHG | WEIGHT: 230 LBS | OXYGEN SATURATION: 96 % | DIASTOLIC BLOOD PRESSURE: 74 MMHG

## 2021-06-24 LAB
ALBUMIN SERPL ELPH-MCNC: 4.5 G/DL
ALP BLD-CCNC: 71 U/L
ALT SERPL-CCNC: 25 U/L
ANION GAP SERPL CALC-SCNC: 14 MMOL/L
AST SERPL-CCNC: 24 U/L
BASOPHILS # BLD AUTO: 0.05 K/UL
BASOPHILS NFR BLD AUTO: 0.6 %
BILIRUB SERPL-MCNC: 0.5 MG/DL
BUN SERPL-MCNC: 11 MG/DL
CALCIUM SERPL-MCNC: 9.6 MG/DL
CHLORIDE SERPL-SCNC: 104 MMOL/L
CO2 SERPL-SCNC: 24 MMOL/L
CREAT SERPL-MCNC: 0.93 MG/DL
EOSINOPHIL # BLD AUTO: 0.06 K/UL
EOSINOPHIL NFR BLD AUTO: 0.7 %
ERYTHROCYTE [SEDIMENTATION RATE] IN BLOOD BY WESTERGREN METHOD: 46 MM/HR
GLUCOSE SERPL-MCNC: 109 MG/DL
HCT VFR BLD CALC: 43.4 %
HGB BLD-MCNC: 13.6 G/DL
IMM GRANULOCYTES NFR BLD AUTO: 0.2 %
LYMPHOCYTES # BLD AUTO: 1.92 K/UL
LYMPHOCYTES NFR BLD AUTO: 23 %
MAN DIFF?: NORMAL
MCHC RBC-ENTMCNC: 27.3 PG
MCHC RBC-ENTMCNC: 31.3 GM/DL
MCV RBC AUTO: 87.1 FL
MONOCYTES # BLD AUTO: 0.66 K/UL
MONOCYTES NFR BLD AUTO: 7.9 %
NEUTROPHILS # BLD AUTO: 5.62 K/UL
NEUTROPHILS NFR BLD AUTO: 67.6 %
PLATELET # BLD AUTO: 341 K/UL
POTASSIUM SERPL-SCNC: 3.9 MMOL/L
PROT SERPL-MCNC: 7.4 G/DL
RBC # BLD: 4.98 M/UL
RBC # FLD: 14.1 %
SODIUM SERPL-SCNC: 141 MMOL/L
WBC # FLD AUTO: 8.33 K/UL

## 2021-06-24 PROCEDURE — 99214 OFFICE O/P EST MOD 30 MIN: CPT

## 2021-06-24 PROCEDURE — 99072 ADDL SUPL MATRL&STAF TM PHE: CPT

## 2021-06-24 NOTE — PHYSICAL EXAM
[Sclera] : the sclera and conjunctiva were normal [] : no rash [Motor Exam] : the motor exam was normal [FreeTextEntry1] : There is tenderness of the first MCP joints bilaterally. There is joint line tenderness of the left knee, and there is pain on extreme ROM of the left hip.  There is joint line tenderness with palpble swelling of the right ankle, and there is tenderness of scattered MTP joints.

## 2021-06-24 NOTE — HISTORY OF PRESENT ILLNESS
[FreeTextEntry1] : Patient reports significant improvement in joint symptoms while on Medrol Dose Pack. - VAS reduced from 10/10 to 2/10 while on the Dose Pack.

## 2021-06-24 NOTE — REVIEW OF SYSTEMS
[Joint Pain] : joint pain [Joint Stiffness] : joint stiffness [Fever] : no fever [Chills] : no chills [Eye Pain] : no eye pain [Red Eyes] : eyes not red [Shortness Of Breath] : no shortness of breath [Cough] : no cough [Abdominal Pain] : no abdominal pain [Diarrhea] : no diarrhea [Dysuria] : no dysuria [Skin Lesions] : no skin lesions [Joint Swelling] : no joint swelling [FreeTextEntry6] : There is no pleuritic C/P

## 2021-06-24 NOTE — HISTORY OF PRESENT ILLNESS
[FreeTextEntry1] : Patient has been taking leflunomide 10 mg daily and is not better, but joint symptoms are actually worse with increasing pain and stiffness. No other progressive symptoms are reported. Also noticing some diarrhea since starting leflunomide.

## 2021-06-24 NOTE — PHYSICAL EXAM
[Sclera] : the sclera and conjunctiva were normal [Cervical Lymph Nodes Enlarged Posterior Bilaterally] : posterior cervical [Cervical Lymph Nodes Enlarged Anterior Bilaterally] : anterior cervical [] : no rash [FreeTextEntry1] : There is tenderness of the first MCP joints bilaterally. There is joint line tenderness of the left knee, and there is pain on extreme ROM of the left hip.  There is joint line tenderness with palpble swelling of the right ankle, and there is tenderness of scattered MTP joints.

## 2021-07-19 ENCOUNTER — NON-APPOINTMENT (OUTPATIENT)
Age: 46
End: 2021-07-19

## 2021-07-19 ENCOUNTER — APPOINTMENT (OUTPATIENT)
Dept: INTERNAL MEDICINE | Facility: CLINIC | Age: 46
End: 2021-07-19
Payer: COMMERCIAL

## 2021-07-19 VITALS
HEIGHT: 66 IN | HEART RATE: 61 BPM | SYSTOLIC BLOOD PRESSURE: 120 MMHG | DIASTOLIC BLOOD PRESSURE: 90 MMHG | OXYGEN SATURATION: 96 % | BODY MASS INDEX: 38.89 KG/M2 | WEIGHT: 242 LBS

## 2021-07-19 PROCEDURE — 99072 ADDL SUPL MATRL&STAF TM PHE: CPT

## 2021-07-19 PROCEDURE — 99396 PREV VISIT EST AGE 40-64: CPT | Mod: 25

## 2021-07-19 PROCEDURE — 36415 COLL VENOUS BLD VENIPUNCTURE: CPT

## 2021-07-19 RX ORDER — FLUCONAZOLE 150 MG/1
150 TABLET ORAL
Qty: 2 | Refills: 0 | Status: COMPLETED | COMMUNITY
Start: 2021-01-19

## 2021-07-19 RX ORDER — AMOXICILLIN AND CLAVULANATE POTASSIUM 875; 125 MG/1; MG/1
875-125 TABLET, COATED ORAL
Qty: 14 | Refills: 0 | Status: COMPLETED | COMMUNITY
Start: 2021-04-23

## 2021-07-19 NOTE — HEALTH RISK ASSESSMENT
[Fair] : ~his/her~ current health as fair  [Good] : ~his/her~  mood as  good [Yes] : Yes [2 - 3 times a week (3 pts)] : 2 - 3  times a week (3 points) [1 or 2 (0 pts)] : 1 or 2 (0 points) [Never (0 pts)] : Never (0 points) [No] : In the past 12 months have you used drugs other than those required for medical reasons? No [No falls in past year] : Patient reported no falls in the past year [0] : 2) Feeling down, depressed, or hopeless: Not at all (0) [Patient reported mammogram was normal] : Patient reported mammogram was normal [Patient reported PAP Smear was normal] : Patient reported PAP Smear was normal [HIV Test offered] : HIV Test offered [Hepatitis C test offered] : Hepatitis C test offered [With Family] : lives with family [# of Members in Household ___] :  household currently consist of [unfilled] member(s) [Employed] : employed [Graduate School] : graduate school [] :  [# Of Children ___] : has [unfilled] children [Feels Safe at Home] : Feels safe at home [Reports normal functional visual acuity (ie: able to read med bottle)] : Reports normal functional visual acuity [Smoke Detector] : smoke detector [Carbon Monoxide Detector] : carbon monoxide detector [Guns at Home] : guns at home [Seat Belt] :  uses seat belt [Sunscreen] : uses sunscreen [PHQ-2 Negative - No further assessment needed] : PHQ-2 Negative - No further assessment needed [] : No [YearQuit] : 2005 [de-identified] : socially  [Audit-CScore] : 3 [de-identified] : none [de-identified] : Pt. on WW [[id number]] : 0 [Change in mental status noted] : No change in mental status noted [Language] : denies difficulty with language [Behavior] : denies difficulty with behavior [Learning/Retaining New Information] : denies difficulty learning/retaining new information [Handling Complex Tasks] : denies difficulty handling complex tasks [Reasoning] : denies difficulty with reasoning [Spatial Ability and Orientation] : denies difficulty with spatial ability and orientation [Reports changes in hearing] : Reports no changes in hearing [Reports changes in vision] : Reports no changes in vision [Reports changes in dental health] : Reports no changes in dental health [Safety elements used in home] : no safety elements used in home [Travel to Developing Areas] : does not  travel to developing areas [Caregiver Concerns] : does not have caregiver concerns [MammogramDate] : 07/20 [PapSmearDate] : 07/20 [BoneDensityDate] : never [ColonoscopyDate] : never [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

## 2021-07-19 NOTE — HISTORY OF PRESENT ILLNESS
[FreeTextEntry1] : physical [de-identified] : Patiient is a 43F with a hx of HTN, hypothyroidism, Migraines, thyroid nodule, presents today for annual exam\par \par numbness in fingers - was middle 2, now all of the fingers. Worse at night

## 2021-07-21 ENCOUNTER — APPOINTMENT (OUTPATIENT)
Dept: INTERNAL MEDICINE | Facility: CLINIC | Age: 46
End: 2021-07-21
Payer: COMMERCIAL

## 2021-07-21 PROCEDURE — 99072 ADDL SUPL MATRL&STAF TM PHE: CPT

## 2021-07-21 PROCEDURE — 36415 COLL VENOUS BLD VENIPUNCTURE: CPT

## 2021-07-23 LAB
ALBUMIN SERPL ELPH-MCNC: 4.1 G/DL
ALP BLD-CCNC: 61 U/L
ALT SERPL-CCNC: 15 U/L
ANION GAP SERPL CALC-SCNC: 12 MMOL/L
AST SERPL-CCNC: 15 U/L
BASOPHILS # BLD AUTO: 0.06 K/UL
BASOPHILS NFR BLD AUTO: 0.8 %
BILIRUB SERPL-MCNC: 0.2 MG/DL
BUN SERPL-MCNC: 16 MG/DL
CALCIUM SERPL-MCNC: 9.4 MG/DL
CHLORIDE SERPL-SCNC: 103 MMOL/L
CO2 SERPL-SCNC: 24 MMOL/L
CREAT SERPL-MCNC: 0.82 MG/DL
EOSINOPHIL # BLD AUTO: 0.03 K/UL
EOSINOPHIL NFR BLD AUTO: 0.4 %
ERYTHROCYTE [SEDIMENTATION RATE] IN BLOOD BY WESTERGREN METHOD: 26 MM/HR
GLUCOSE SERPL-MCNC: 98 MG/DL
HCT VFR BLD CALC: 39.5 %
HGB BLD-MCNC: 13.1 G/DL
IMM GRANULOCYTES NFR BLD AUTO: 0.3 %
LYMPHOCYTES # BLD AUTO: 2.01 K/UL
LYMPHOCYTES NFR BLD AUTO: 25.9 %
MAN DIFF?: NORMAL
MCHC RBC-ENTMCNC: 29 PG
MCHC RBC-ENTMCNC: 33.2 GM/DL
MCV RBC AUTO: 87.6 FL
MONOCYTES # BLD AUTO: 0.96 K/UL
MONOCYTES NFR BLD AUTO: 12.4 %
NEUTROPHILS # BLD AUTO: 4.67 K/UL
NEUTROPHILS NFR BLD AUTO: 60.2 %
PLATELET # BLD AUTO: 274 K/UL
POTASSIUM SERPL-SCNC: 4.5 MMOL/L
PROT SERPL-MCNC: 6.8 G/DL
RBC # BLD: 4.51 M/UL
RBC # FLD: 15.6 %
SODIUM SERPL-SCNC: 139 MMOL/L
WBC # FLD AUTO: 7.75 K/UL

## 2021-07-27 LAB
25(OH)D3 SERPL-MCNC: 18.8 NG/ML
ALBUMIN SERPL ELPH-MCNC: 4.1 G/DL
ALP BLD-CCNC: 54 U/L
ALT SERPL-CCNC: 16 U/L
ANION GAP SERPL CALC-SCNC: 13 MMOL/L
AST SERPL-CCNC: 13 U/L
BASOPHILS # BLD AUTO: 0.05 K/UL
BASOPHILS NFR BLD AUTO: 0.6 %
BILIRUB SERPL-MCNC: 0.6 MG/DL
BUN SERPL-MCNC: 14 MG/DL
CALCIUM SERPL-MCNC: 9.4 MG/DL
CHLORIDE SERPL-SCNC: 103 MMOL/L
CHOLEST SERPL-MCNC: 224 MG/DL
CO2 SERPL-SCNC: 24 MMOL/L
CREAT SERPL-MCNC: 0.8 MG/DL
EOSINOPHIL # BLD AUTO: 0.04 K/UL
EOSINOPHIL NFR BLD AUTO: 0.5 %
ESTIMATED AVERAGE GLUCOSE: 117 MG/DL
GLUCOSE SERPL-MCNC: 74 MG/DL
HBA1C MFR BLD HPLC: 5.7 %
HCT VFR BLD CALC: 42.5 %
HDLC SERPL-MCNC: 65 MG/DL
HGB BLD-MCNC: 13.4 G/DL
IMM GRANULOCYTES NFR BLD AUTO: 0.2 %
LDLC SERPL CALC-MCNC: 127 MG/DL
LYMPHOCYTES # BLD AUTO: 2.5 K/UL
LYMPHOCYTES NFR BLD AUTO: 30.5 %
MAN DIFF?: NORMAL
MCHC RBC-ENTMCNC: 27.5 PG
MCHC RBC-ENTMCNC: 31.5 GM/DL
MCV RBC AUTO: 87.3 FL
MONOCYTES # BLD AUTO: 0.83 K/UL
MONOCYTES NFR BLD AUTO: 10.1 %
NEUTROPHILS # BLD AUTO: 4.77 K/UL
NEUTROPHILS NFR BLD AUTO: 58.1 %
NONHDLC SERPL-MCNC: 159 MG/DL
PLATELET # BLD AUTO: 308 K/UL
POTASSIUM SERPL-SCNC: 4.4 MMOL/L
PROT SERPL-MCNC: 6.7 G/DL
RBC # BLD: 4.87 M/UL
RBC # FLD: 14.9 %
SODIUM SERPL-SCNC: 140 MMOL/L
TRIGL SERPL-MCNC: 161 MG/DL
TSH SERPL-ACNC: 3.94 UIU/ML
WBC # FLD AUTO: 8.21 K/UL

## 2021-08-09 ENCOUNTER — RX RENEWAL (OUTPATIENT)
Age: 46
End: 2021-08-09

## 2021-08-10 ENCOUNTER — RX RENEWAL (OUTPATIENT)
Age: 46
End: 2021-08-10

## 2021-08-18 ENCOUNTER — APPOINTMENT (OUTPATIENT)
Dept: INTERNAL MEDICINE | Facility: CLINIC | Age: 46
End: 2021-08-18

## 2021-08-19 ENCOUNTER — APPOINTMENT (OUTPATIENT)
Dept: RHEUMATOLOGY | Facility: CLINIC | Age: 46
End: 2021-08-19
Payer: COMMERCIAL

## 2021-08-19 VITALS
WEIGHT: 240 LBS | SYSTOLIC BLOOD PRESSURE: 110 MMHG | HEIGHT: 66 IN | OXYGEN SATURATION: 60 % | DIASTOLIC BLOOD PRESSURE: 80 MMHG | HEART RATE: 94 BPM | BODY MASS INDEX: 38.57 KG/M2

## 2021-08-19 PROCEDURE — 99214 OFFICE O/P EST MOD 30 MIN: CPT

## 2021-08-19 NOTE — HISTORY OF PRESENT ILLNESS
[FreeTextEntry1] : Patient has been tapering prednisone by 2.5 mg weekly and is currently taking 5 mg daily. She is having some increase mild AM stiffness, but she would like to continue tapering without adjusting any other medications. Patient reports she is functioning better on current regimen.

## 2021-08-19 NOTE — PHYSICAL EXAM
[Sclera] : the sclera and conjunctiva were normal [Cervical Lymph Nodes Enlarged Posterior Bilaterally] : posterior cervical [Cervical Lymph Nodes Enlarged Anterior Bilaterally] : anterior cervical [] : no rash [FreeTextEntry1] : There is pain on extreme ROM of the left hip.  There is joint line tenderness without swelling of the right ankle, and there is tenderness of scattered MTP joints.

## 2021-08-20 LAB
ALBUMIN SERPL ELPH-MCNC: 4.2 G/DL
ALP BLD-CCNC: 61 U/L
ALT SERPL-CCNC: 22 U/L
ANION GAP SERPL CALC-SCNC: 11 MMOL/L
AST SERPL-CCNC: 16 U/L
BASOPHILS # BLD AUTO: 0.06 K/UL
BASOPHILS NFR BLD AUTO: 0.9 %
BILIRUB SERPL-MCNC: 0.4 MG/DL
BUN SERPL-MCNC: 16 MG/DL
CALCIUM SERPL-MCNC: 9.9 MG/DL
CHLORIDE SERPL-SCNC: 103 MMOL/L
CO2 SERPL-SCNC: 25 MMOL/L
CREAT SERPL-MCNC: 0.78 MG/DL
EOSINOPHIL # BLD AUTO: 0.06 K/UL
EOSINOPHIL NFR BLD AUTO: 0.9 %
GLUCOSE SERPL-MCNC: 87 MG/DL
HCT VFR BLD CALC: 45.3 %
HGB BLD-MCNC: 14.2 G/DL
IMM GRANULOCYTES NFR BLD AUTO: 0.3 %
LYMPHOCYTES # BLD AUTO: 2.18 K/UL
LYMPHOCYTES NFR BLD AUTO: 32.9 %
MAN DIFF?: NORMAL
MCHC RBC-ENTMCNC: 27.4 PG
MCHC RBC-ENTMCNC: 31.3 GM/DL
MCV RBC AUTO: 87.5 FL
MONOCYTES # BLD AUTO: 0.65 K/UL
MONOCYTES NFR BLD AUTO: 9.8 %
NEUTROPHILS # BLD AUTO: 3.65 K/UL
NEUTROPHILS NFR BLD AUTO: 55.2 %
PLATELET # BLD AUTO: 324 K/UL
POTASSIUM SERPL-SCNC: 4.3 MMOL/L
PROT SERPL-MCNC: 7.2 G/DL
RBC # BLD: 5.18 M/UL
RBC # FLD: 15.2 %
SODIUM SERPL-SCNC: 140 MMOL/L
WBC # FLD AUTO: 6.62 K/UL

## 2021-09-03 ENCOUNTER — RX RENEWAL (OUTPATIENT)
Age: 46
End: 2021-09-03

## 2021-09-09 ENCOUNTER — APPOINTMENT (OUTPATIENT)
Dept: ENDOCRINOLOGY | Facility: CLINIC | Age: 46
End: 2021-09-09
Payer: COMMERCIAL

## 2021-09-09 LAB
24R-OH-CALCIDIOL SERPL-MCNC: 23.9 PG/ML
FOLATE SERPL-MCNC: 12.7 NG/ML
T4 FREE SERPL-MCNC: 0.9 NG/DL
TSH SERPL-ACNC: 2.71 UIU/ML
VIT B12 SERPL-MCNC: 344 PG/ML

## 2021-09-09 PROCEDURE — G0447 BEHAVIOR COUNSEL OBESITY 15M: CPT | Mod: 59,95

## 2021-09-09 PROCEDURE — 99215 OFFICE O/P EST HI 40 MIN: CPT | Mod: 25,95

## 2021-09-09 NOTE — HISTORY OF PRESENT ILLNESS
[Home] : at home, [unfilled] , at the time of the visit. [Medical Office: ([facility])___] : at the medical office located in  [Verbal consent obtained from patient] : the patient, [unfilled] [FreeTextEntry1] : prior   Endo- Dr. Douglas ([facility])-4-5 yrs\par        47 yo WW f/u for hypothyroidism, Hashimoto thyroiditis, thyroid nodules, \par \par       used to be  on Synthroid LINDA 100mcg qd takes it correctly , more energy since [place] 90mg qd labs reviewed low normal  but still tired \par has had hip pain did not sleep well for the last two nights \par        denies Fhx thyroid cancer\par        denies neck irradiation\par        denies dysphagia, \par        denies dyspnea\par        denies dysphonia \par        7/6/17 right thyroid nodule hypoechoic 1cm FNA negative for malignancy \par        Dr Horton tried to get for her Victoza, Saxenda, either covered by her insurance unless she develops type 2 DM2 and she fails Metformin.\par \par started weight watchers doing well loosing weight

## 2021-09-13 ENCOUNTER — TRANSCRIPTION ENCOUNTER (OUTPATIENT)
Age: 46
End: 2021-09-13

## 2021-09-16 ENCOUNTER — APPOINTMENT (OUTPATIENT)
Dept: DISASTER EMERGENCY | Facility: HOSPITAL | Age: 46
End: 2021-09-16

## 2021-09-17 ENCOUNTER — TRANSCRIPTION ENCOUNTER (OUTPATIENT)
Age: 46
End: 2021-09-17

## 2021-09-18 ENCOUNTER — TRANSCRIPTION ENCOUNTER (OUTPATIENT)
Age: 46
End: 2021-09-18

## 2021-09-19 ENCOUNTER — TRANSCRIPTION ENCOUNTER (OUTPATIENT)
Age: 46
End: 2021-09-19

## 2021-09-22 ENCOUNTER — NON-APPOINTMENT (OUTPATIENT)
Age: 46
End: 2021-09-22

## 2021-09-29 ENCOUNTER — APPOINTMENT (OUTPATIENT)
Dept: RHEUMATOLOGY | Facility: CLINIC | Age: 46
End: 2021-09-29

## 2021-09-29 ENCOUNTER — APPOINTMENT (OUTPATIENT)
Dept: INTERNAL MEDICINE | Facility: CLINIC | Age: 46
End: 2021-09-29

## 2021-11-03 ENCOUNTER — APPOINTMENT (OUTPATIENT)
Dept: INTERNAL MEDICINE | Facility: CLINIC | Age: 46
End: 2021-11-03

## 2021-11-03 ENCOUNTER — APPOINTMENT (OUTPATIENT)
Dept: RHEUMATOLOGY | Facility: CLINIC | Age: 46
End: 2021-11-03
Payer: COMMERCIAL

## 2021-11-03 PROCEDURE — 36415 COLL VENOUS BLD VENIPUNCTURE: CPT

## 2021-11-04 LAB
ALBUMIN SERPL ELPH-MCNC: 4 G/DL
ALP BLD-CCNC: 63 U/L
ALT SERPL-CCNC: 21 U/L
ANION GAP SERPL CALC-SCNC: 14 MMOL/L
AST SERPL-CCNC: 16 U/L
BASOPHILS # BLD AUTO: 0.05 K/UL
BASOPHILS NFR BLD AUTO: 1 %
BILIRUB SERPL-MCNC: 0.3 MG/DL
BUN SERPL-MCNC: 12 MG/DL
CALCIUM SERPL-MCNC: 9.4 MG/DL
CHLORIDE SERPL-SCNC: 102 MMOL/L
CO2 SERPL-SCNC: 22 MMOL/L
CREAT SERPL-MCNC: 0.64 MG/DL
EOSINOPHIL # BLD AUTO: 0.08 K/UL
EOSINOPHIL NFR BLD AUTO: 1.7 %
GLUCOSE SERPL-MCNC: 92 MG/DL
HCT VFR BLD CALC: 42.6 %
HGB BLD-MCNC: 13.3 G/DL
IMM GRANULOCYTES NFR BLD AUTO: 0 %
LYMPHOCYTES # BLD AUTO: 1.87 K/UL
LYMPHOCYTES NFR BLD AUTO: 38.6 %
MAN DIFF?: NORMAL
MCHC RBC-ENTMCNC: 26.9 PG
MCHC RBC-ENTMCNC: 31.2 GM/DL
MCV RBC AUTO: 86.2 FL
MONOCYTES # BLD AUTO: 0.62 K/UL
MONOCYTES NFR BLD AUTO: 12.8 %
NEUTROPHILS # BLD AUTO: 2.22 K/UL
NEUTROPHILS NFR BLD AUTO: 45.9 %
PLATELET # BLD AUTO: 290 K/UL
POTASSIUM SERPL-SCNC: 4.1 MMOL/L
PROT SERPL-MCNC: 6.9 G/DL
RBC # BLD: 4.94 M/UL
RBC # FLD: 14.9 %
SODIUM SERPL-SCNC: 138 MMOL/L
WBC # FLD AUTO: 4.84 K/UL

## 2021-11-05 ENCOUNTER — RX RENEWAL (OUTPATIENT)
Age: 46
End: 2021-11-05

## 2021-11-10 ENCOUNTER — APPOINTMENT (OUTPATIENT)
Dept: RHEUMATOLOGY | Facility: CLINIC | Age: 46
End: 2021-11-10
Payer: COMMERCIAL

## 2021-11-10 VITALS
WEIGHT: 242 LBS | TEMPERATURE: 98.1 F | BODY MASS INDEX: 38.89 KG/M2 | SYSTOLIC BLOOD PRESSURE: 128 MMHG | HEART RATE: 87 BPM | OXYGEN SATURATION: 99 % | HEIGHT: 66 IN | DIASTOLIC BLOOD PRESSURE: 69 MMHG

## 2021-11-10 DIAGNOSIS — M46.96 UNSPECIFIED INFLAMMATORY SPONDYLOPATHY, LUMBAR REGION: ICD-10-CM

## 2021-11-10 PROCEDURE — 99214 OFFICE O/P EST MOD 30 MIN: CPT

## 2021-11-10 RX ORDER — DULOXETINE HYDROCHLORIDE 60 MG/1
60 CAPSULE, DELAYED RELEASE ORAL DAILY
Refills: 0 | Status: ACTIVE | COMMUNITY

## 2021-11-10 NOTE — PHYSICAL EXAM
[Sclera] : the sclera and conjunctiva were normal [Cervical Lymph Nodes Enlarged Posterior Bilaterally] : posterior cervical [Cervical Lymph Nodes Enlarged Anterior Bilaterally] : anterior cervical [] : no rash [FreeTextEntry1] : There is pain on extreme external rotation of the left hip.  There is minimal joint line tenderness without swelling of the right ankle, and there is minimal tenderness of right fifth MTP joint.

## 2021-11-10 NOTE — HISTORY OF PRESENT ILLNESS
[FreeTextEntry1] : Patient reports feeling much better while on higher dose of leflunomide, but she is still taking prednisone 5 mg daily.

## 2021-11-18 ENCOUNTER — APPOINTMENT (OUTPATIENT)
Dept: RHEUMATOLOGY | Facility: CLINIC | Age: 46
End: 2021-11-18

## 2021-12-30 ENCOUNTER — APPOINTMENT (OUTPATIENT)
Dept: RHEUMATOLOGY | Facility: CLINIC | Age: 46
End: 2021-12-30
Payer: COMMERCIAL

## 2021-12-30 PROCEDURE — 36415 COLL VENOUS BLD VENIPUNCTURE: CPT

## 2022-01-01 ENCOUNTER — NON-APPOINTMENT (OUTPATIENT)
Age: 47
End: 2022-01-01

## 2022-01-01 LAB
ALBUMIN SERPL ELPH-MCNC: 4.1 G/DL
ALP BLD-CCNC: 66 U/L
ALT SERPL-CCNC: 52 U/L
ANION GAP SERPL CALC-SCNC: 12 MMOL/L
AST SERPL-CCNC: 45 U/L
BASOPHILS # BLD AUTO: 0.07 K/UL
BASOPHILS NFR BLD AUTO: 1.8 %
BILIRUB SERPL-MCNC: 0.5 MG/DL
BUN SERPL-MCNC: 10 MG/DL
CALCIUM SERPL-MCNC: 9.4 MG/DL
CHLORIDE SERPL-SCNC: 104 MMOL/L
CO2 SERPL-SCNC: 23 MMOL/L
CREAT SERPL-MCNC: 0.76 MG/DL
EOSINOPHIL # BLD AUTO: 0.1 K/UL
EOSINOPHIL NFR BLD AUTO: 2.5 %
GLUCOSE SERPL-MCNC: 109 MG/DL
HCT VFR BLD CALC: 42.8 %
HGB BLD-MCNC: 13.3 G/DL
IMM GRANULOCYTES NFR BLD AUTO: 0.3 %
LYMPHOCYTES # BLD AUTO: 1.29 K/UL
LYMPHOCYTES NFR BLD AUTO: 32.4 %
MAN DIFF?: NORMAL
MCHC RBC-ENTMCNC: 27.1 PG
MCHC RBC-ENTMCNC: 31.1 GM/DL
MCV RBC AUTO: 87.3 FL
MONOCYTES # BLD AUTO: 0.58 K/UL
MONOCYTES NFR BLD AUTO: 14.6 %
NEUTROPHILS # BLD AUTO: 1.93 K/UL
NEUTROPHILS NFR BLD AUTO: 48.4 %
PLATELET # BLD AUTO: 301 K/UL
POTASSIUM SERPL-SCNC: 4.4 MMOL/L
PROT SERPL-MCNC: 7 G/DL
RBC # BLD: 4.9 M/UL
RBC # FLD: 14.5 %
SODIUM SERPL-SCNC: 139 MMOL/L
WBC # FLD AUTO: 3.98 K/UL

## 2022-01-14 ENCOUNTER — RESULT REVIEW (OUTPATIENT)
Age: 47
End: 2022-01-14

## 2022-01-18 ENCOUNTER — APPOINTMENT (OUTPATIENT)
Dept: RHEUMATOLOGY | Facility: CLINIC | Age: 47
End: 2022-01-18
Payer: COMMERCIAL

## 2022-01-18 PROCEDURE — 99214 OFFICE O/P EST MOD 30 MIN: CPT

## 2022-01-18 RX ORDER — BUPROPION HYDROCHLORIDE 150 MG/1
150 TABLET, EXTENDED RELEASE ORAL
Qty: 30 | Refills: 0 | Status: ACTIVE | COMMUNITY
Start: 2021-12-21

## 2022-01-18 RX ORDER — LEFLUNOMIDE 20 MG/1
20 TABLET, FILM COATED ORAL
Qty: 65 | Refills: 1 | Status: DISCONTINUED | COMMUNITY
Start: 2021-03-17 | End: 2022-01-18

## 2022-01-18 NOTE — HISTORY OF PRESENT ILLNESS
[FreeTextEntry1] : Still taking prednisone 2.5 mg daily with marginal control of inflammatory joint symptoms. Not taking leflunomide.

## 2022-01-21 ENCOUNTER — APPOINTMENT (OUTPATIENT)
Dept: ENDOCRINOLOGY | Facility: CLINIC | Age: 47
End: 2022-01-21
Payer: COMMERCIAL

## 2022-01-21 VITALS
HEIGHT: 66 IN | OXYGEN SATURATION: 98 % | WEIGHT: 240 LBS | DIASTOLIC BLOOD PRESSURE: 70 MMHG | HEART RATE: 64 BPM | BODY MASS INDEX: 38.57 KG/M2 | SYSTOLIC BLOOD PRESSURE: 118 MMHG

## 2022-01-21 LAB
25(OH)D3 SERPL-MCNC: 34.2 NG/ML
ALBUMIN SERPL ELPH-MCNC: 4.2 G/DL
ALP BLD-CCNC: 68 U/L
ALT SERPL-CCNC: 15 U/L
ANION GAP SERPL CALC-SCNC: 11 MMOL/L
AST SERPL-CCNC: 14 U/L
BILIRUB SERPL-MCNC: 0.4 MG/DL
BUN SERPL-MCNC: 10 MG/DL
CALCIUM SERPL-MCNC: 9.5 MG/DL
CALCIUM SERPL-MCNC: 9.5 MG/DL
CHLORIDE SERPL-SCNC: 104 MMOL/L
CO2 SERPL-SCNC: 24 MMOL/L
CREAT SERPL-MCNC: 0.75 MG/DL
FOLATE SERPL-MCNC: 13.2 NG/ML
GLUCOSE SERPL-MCNC: 96 MG/DL
PARATHYROID HORMONE INTACT: 30 PG/ML
POTASSIUM SERPL-SCNC: 4.2 MMOL/L
PROT SERPL-MCNC: 7 G/DL
SODIUM SERPL-SCNC: 139 MMOL/L
T4 FREE SERPL-MCNC: 1.1 NG/DL
TSH SERPL-ACNC: 0.58 UIU/ML
VIT B12 SERPL-MCNC: 361 PG/ML

## 2022-01-21 PROCEDURE — 99215 OFFICE O/P EST HI 40 MIN: CPT

## 2022-01-21 PROCEDURE — G0447 BEHAVIOR COUNSEL OBESITY 15M: CPT

## 2022-01-21 NOTE — HISTORY OF PRESENT ILLNESS
[address]   Cardiac Followup    Referring Provider:  Britton Monsalve     Chief Complaint   Patient presents with    Follow-up    Coronary Artery Disease    Hypertension    Edema     lasix helps with leg edema    Chest Pain     comes and goes    Shortness of Breath      Subjective: Mr Genna Parra presents today for cardiology follow up of CAD s/p stents, hx CABG, HTN, HLD, and lower extremity edema; c/o baseline CLINE and occasional CP    History of Present Illness:    Mr. Genna Parra is a 68yo male with hx of CAD s/p 4V CABG 3/17, HTN, HLD, and mild AS. He is s/p LAD and OM stent with PTCA only of the diagonal branch in 2003. On 5/12/09 he underwent repeat intervention with ALLYN of the OM branch overlapping a prior placed stent in 2003. Note cath on 9/30/11 for c/o new-onset exertional chest pain during office visit 9/23/11. Cath showed widely patent stents previously placed to LAD stent and obtuse marginal branches (no change from 5/09 study). Due to exertional CP and SOB symptoms I referred him for [facility] on 3/17/17 which showed 70-80% LAD, 99% D1, 70% OM1, 90% OM2, 100% mid RCA; normal LVEF; normal hemodynamics. Referred to CT surgery and on 3/21/17 he underwent 4V CABG by Dr. Genoveva Avalos with LIMA to LAD and reverse SVG to ramus OM distal to the stent and PDA. Most recent VL carotid Duplex showed 3/20/17 showed minimal plaque <50% bilaterally. He underwent LHC 8/7/17 PCI Complex LAD, diag Rt groin and on again 8/9/17 had PCI of complex circumflex, RCA  with Dr. Shaun Lewis at [facility]. He states his Plavix has been increased to 150 mg daily by Dr. Wale Yuan at [facility] (he went for 2nd opinion) based on genetic testing results. His blood work showed systemic inflammation with elevated CRP,  lipoprotein A, ADP response 42,.indidicating a lack of full response. At that time plavix and aspirin dosing was increased and niacin added and imdur dose increased. Note LHC at [facility] per Dr. Shaun Lewis on 1/20/18 which showed LAD: Mid-vessel lesion 50% in-stent restenosis, Proximal vessel lesion: 70% stenosis, 1st diagonal: Proximal vessel lesion: 100% in-stent stenosis, Right coronary: Mid-vessel [id number]% stenosis; patent LIMA-LAD; patent small SVG-small distal RCA branch; patent SVG to the lateral 1st obtuse marginal, from the aorta: Proximal anastomosis lesion: 60% stenosis, Distal anastomosis lesion: 60% stenosis. No PCI placed. EECP done afterwards. Lexiscan myoview stress test 10/9/18 small to moderate sized area of ischemia within the mid anterior wall. EF 71%. Note felt significant and doing better clinically so followed medically only without cath. [Home] : at home, [unfilled] , at the time of the visit. [Medical Office: ([facility])___] : at the medical office located in  [Verbal consent obtained from patient] : the patient, [unfilled] has a past surgical history that includes Coronary angioplasty with stent; Cardiac catheterization (09/26/2011); Coronary artery bypass graft (03/21/2017); Cardiac catheterization (03/17/2017); Coronary angioplasty with stent (05/12/2009); Coronary angioplasty with stent (08/15/2003); and Coronary angioplasty with stent. Social History:   reports that he quit smoking about 27 years ago. His smoking use included cigarettes. He has a 12.00 pack-year smoking history. He has never used smokeless tobacco. He reports current alcohol use. He reports that he does not use drugs. 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 Family History:  Negative for significant heart disease in parents     Home Medications:  Prior to Admission medications    Medication Sig Start Date End Date Taking? Authorizing Provider   naproxen (NAPROXEN) 500 MG EC tablet Take 500 mg by mouth 2 times daily (with meals). Prn      Yes Historical Provider, MD   lisinopril (PRINIVIL;ZESTRIL) 10 MG tablet Take 1 tablet by mouth daily. 9/19/11  Yes Glendy Santiago MD   aspirin 81 MG tablet Take 81 mg by mouth daily. Yes Historical Provider, MD   ranitidine (ZANTAC) 150 MG tablet Take 150 mg by mouth daily as needed. Yes Historical Provider, MD   atorvastatin (LIPITOR) 40 MG tablet Take 40 mg by mouth nightly. Yes Historical Provider, MD   clopidogrel (PLAVIX) 75 MG tablet Take 75 mg by mouth daily. Yes Historical Provider, MD   nitroGLYCERIN (NITROSTAT) 0.4 MG SL tablet Place 1 tablet under the tongue every 5 minutes as needed for Chest pain. 9/19/11 9/18/12  Glendy Santiago MD        Allergies:  Patient has no known allergies. Review of Systems:   · Constitutional: there has been no unanticipated weight loss. There's been no change in energy level, sleep pattern, or activity level. · Eyes: No visual changes or diplopia. No scleral icterus. · ENT: No Headaches, hearing loss or vertigo. No mouth sores or sore throat. [FreeTextEntry1] : prior   Endo- Dr. Douglas ([facility])-4-5 yrs\par        47 yo WW f/u for hypothyroidism, Hashimoto thyroiditis, thyroid nodules, \par \par       used to be  on Synthroid LINDA 100mcg qd takes it correctly , more energy since [place] 90mg qd labs reviewed low normal  but still tired \par has had hip pain did not sleep well for the last two nights \par        denies Fhx thyroid cancer\par        denies neck irradiation\par        denies dysphagia, \par        denies dyspnea\par        denies dysphonia \par        7/6/17 right thyroid nodule hypoechoic 1cm FNA negative for malignancy \par        Dr Horton tried to get for her Victoza, Saxenda, either covered by her insurance unless she develops type 2 DM2 and she fails Metformin.\par \par started weight watchers doing well loosing weight  · Cardiovascular: Reviewed in HPI  · Respiratory: No cough or wheezing, no sputum production. No hematemesis. · Gastrointestinal: No abdominal pain, appetite loss, blood in stools. No change in bowel or bladder habits. · Genitourinary: No dysuria, trouble voiding, or hematuria. · Musculoskeletal:  No gait disturbance, weakness or joint complaints. · Integumentary: No rash or pruritis. · Neurological: No headache, diplopia, change in muscle strength, numbness or tingling. No change in gait, balance, coordination, mood, affect, memory, mentation, behavior. · Psychiatric: No anxiety, no depression. · Endocrine: No malaise, fatigue or temperature intolerance. No excessive thirst, fluid intake, or urination. No tremor. · Hematologic/Lymphatic: No abnormal bruising or bleeding, blood clots or swollen lymph nodes. · Allergic/Immunologic: No nasal congestion or hives. Physical Examination:    Vitals:    01/04/21 0753   BP: 120/74   Pulse: 65   SpO2: 97%        Wt Readings from Last 3 Encounters:   01/04/21 188 lb 8 oz (85.5 kg)   06/02/20 187 lb 9.6 oz (85.1 kg)   08/22/19 185 lb (83.9 kg)     Constitutional and General Appearance: NAD   Respiratory:  · Normal excursion and expansion without use of accessory muscles  · Resp Auscultation: Normal breath sounds without dullness  Cardiovascular:  · The apical impulses not displaced  · Heart tones are crisp and normal  · Cervical veins are not engorged  · The carotid upstroke is normal in amplitude and contour without delay or bruit  · Normal S1S2, No S3, +Soft NANI  · Peripheral pulses are symmetrical and full  · There is no clubbing, cyanosis of the extremities.   · Trace BLE edema (improved from prior)  · Femoral Arteries: 2+ and equal  · Pedal Pulses: 2+ and equal   Abdomen:  · No masses or tenderness  · Liver/Spleen: No Abnormalities Noted  Neurological/Psychiatric:  · Alert and oriented in all spheres  · Moves all extremities well · Exhibits normal gait balance and coordination  · No abnormalities of mood, affect, memory, mentation, or behavior are noted    GXT stress Test 7/20/17   ABNORMAL HIGH risk stress test  Normal LV size and systolic function. Left ventricular ejection fraction of  71%. Normal wall motion. There is a large severe defect in the anterior and  anterolateral walls at stress that completely reveres with rest consistent  with ischemia. Stress test 8/22/2019   Summary  Abnormal moderate risk myocardial perfusion study. There is moderate sized area of mild ischemia within the anterior-lateral  wall. Normal left ventricular size, wall motion, and function. The estimated left ventricular function is 73%. Echo 8/22/2019 Summary   Normal left ventricle systolic function with an estimated EF of 55%. No regional wall motion abnormalities are seen. Normal left ventricular diastolic filling pressure. The aortic valve is thickened/calcified with decreased leaflet mobility. Mild aortic stenosis. Trace aortic regurgitation. Mild to moderate tricuspid regurgitation. Systolic pulmonary artery pressure (SPAP) is normal and estimated at 25 mmHg   (right atrial pressure 3 mmHg).        Lab Results   Component Value Date    CHOL 131 08/22/2019    CHOL 152 07/10/2018    CHOL 97 07/10/2018     Lab Results   Component Value Date    TRIG 142 08/22/2019    TRIG 160 (H) 07/10/2018    TRIG 152 (H) 07/05/2017     Lab Results   Component Value Date    HDL 56 08/22/2019    HDL 55 07/10/2018    HDL 52 07/05/2017     Lab Results   Component Value Date    LDLCALC 47 08/22/2019    LDLCALC 65 07/10/2018    LDLCALC 74 07/05/2017     Lab Results   Component Value Date    LABVLDL 28 08/22/2019    LABVLDL 30 07/05/2017    LABVLDL 26 03/21/2017       Labs: BMP: 6/12/2020 6/12/2020 NA+ 134, K+ 4.3, BUN 24, and creatinine 0.98  Last lipids 4/3/2020 , HDL 55, LDL 64,     Assessment: 1. CAD (coronary artery disease):  Due to exertional CP and SOB symptoms I referred him for [address] on 3/17/17 which showed multi-vessel CAD. Referred for surgical revascularization and on 3/21/17 he underwent 4V CABG by Dr. Ashley Quintanilla with LIMA to LAD and reverse SVG to ramus OM distal to the stent and PDA. Most recent [address] on 9/18/2019 that showed Severe diffuse disease but patent LIMA to LAD, SVG to small OM, and patent stents to LAD, OM, and RCA. No significant change when compared to previous films No need for PCI. He feels chronic angina baseline and responsive to occasional use of SL PRN NTG.     2. Essential hypertension, benign:  Well controlled and will continue current medical regimen. 3.  Hyperlipidemia: I personally reviewed most recent labs from 4/2020 Well controlled and at goal and will continue current medical regimen. 4. Lower extremity edema: Possibly venous insufficiency. Most recent BLE venous dopplers on 6/2/2020 negative for DVT. Note edema improved with lasix and will continue. Plan:  1. Continue current medications   2. Plan to repeat echocardiogram prior to next visit this summer to recheck aortic stenosis   3. He will be having blood work through PCP. We will give slips for blood work. Call and let us know when blood work is done. 4. Follow up with me in 6 months     Cost of prescription medications and patient compliance have been reviewed with patient. All questions answered. Scribe's attestation: This note was scribed in the presence of Dr. Tanya Vo, By Nicole Alvarado RN    I, Dr. Tanya Vo, personally performed the services described in this documentation, as scribed by the above signed scribe in my presence. It is both accurate and complete to my knowledge. I agree with the details independently gathered by the clinical support staff, while the remaining scribed note accurately describes my personal service to the patient. Thank you for allowing me to participate in the care of this individual.    Rik Bhakta.  Bambi Hardy M.D., [facility] [place]

## 2022-02-17 ENCOUNTER — APPOINTMENT (OUTPATIENT)
Dept: RHEUMATOLOGY | Facility: CLINIC | Age: 47
End: 2022-02-17

## 2022-02-23 ENCOUNTER — RX RENEWAL (OUTPATIENT)
Age: 47
End: 2022-02-23

## 2022-04-20 NOTE — HISTORY OF PRESENT ILLNESS
[FreeTextEntry1] : Patient reports several years of joint discomfort with AM stiffness, but with pain all day. More recently, a podiatrist Dx'ed patient with Raynaud's (although she does not describe true Raynaud's).There has been no rash or skin photosensitivity, serositis, hair thinning, fever or Hx of thrombotic events. She is  with one induced .  Speaking Coherently

## 2022-04-27 ENCOUNTER — APPOINTMENT (OUTPATIENT)
Dept: INTERNAL MEDICINE | Facility: CLINIC | Age: 47
End: 2022-04-27
Payer: COMMERCIAL

## 2022-04-27 VITALS
WEIGHT: 240 LBS | DIASTOLIC BLOOD PRESSURE: 70 MMHG | BODY MASS INDEX: 38.57 KG/M2 | TEMPERATURE: 98 F | SYSTOLIC BLOOD PRESSURE: 115 MMHG | HEIGHT: 66 IN

## 2022-04-27 DIAGNOSIS — G56.01 CARPAL TUNNEL SYNDROME, RIGHT UPPER LIMB: ICD-10-CM

## 2022-04-27 DIAGNOSIS — Z12.39 ENCOUNTER FOR OTHER SCREENING FOR MALIGNANT NEOPLASM OF BREAST: ICD-10-CM

## 2022-04-27 PROCEDURE — 99214 OFFICE O/P EST MOD 30 MIN: CPT

## 2022-05-01 PROBLEM — G56.01 CARPAL TUNNEL SYNDROME OF RIGHT WRIST: Status: ACTIVE | Noted: 2021-07-19

## 2022-05-01 NOTE — HISTORY OF PRESENT ILLNESS
[de-identified] : Patient is a 46F with a hx of inflammatory polyarthritis, HTN, hypothyroidism, Migraines, thyroid nodule, presents today for pesistent pain in the left hip\par Following with Dr. Ramos, but patient not taking the Plaquenil\par Hip Xrays in 1/2022 showed degenerative arthritis

## 2022-05-01 NOTE — PHYSICAL EXAM
[Normal Sclera/Conjunctiva] : normal sclera/conjunctiva [Normal Outer Ear/Nose] : the outer ears and nose were normal in appearance [No Edema] : there was no peripheral edema [No Joint Swelling] : no joint swelling [Grossly Normal Strength/Tone] : grossly normal strength/tone [Normal] : normal gait, coordination grossly intact, no focal deficits and deep tendon reflexes were 2+ and symmetric

## 2022-05-01 NOTE — HEALTH RISK ASSESSMENT
[Former] : Former [Yes] : Yes [2 - 4 times a month (2 pts)] : 2-4 times a month (2 points) [1 or 2 (0 pts)] : 1 or 2 (0 points) [Never (0 pts)] : Never (0 points) [No] : In the past 12 months have you used drugs other than those required for medical reasons? No [No falls in past year] : Patient reported no falls in the past year [0] : 1) Little interest or pleasure doing things: Not at all (0) [PHQ-2 Negative - No further assessment needed] : PHQ-2 Negative - No further assessment needed [Patient/Caregiver not ready to engage] : , patient/caregiver not ready to engage [YearQuit] : 2006 [de-identified] : not at this time  [de-identified] : socially  [de-identified] : needs improvement  [[id number]] : 0 [AdvancecareDate] : 4/2022

## 2022-06-25 ENCOUNTER — RX RENEWAL (OUTPATIENT)
Age: 47
End: 2022-06-25

## 2022-08-03 ENCOUNTER — APPOINTMENT (OUTPATIENT)
Dept: FAMILY MEDICINE | Facility: CLINIC | Age: 47
End: 2022-08-03

## 2022-08-03 VITALS
BODY MASS INDEX: 38.57 KG/M2 | OXYGEN SATURATION: 97 % | SYSTOLIC BLOOD PRESSURE: 140 MMHG | HEART RATE: 75 BPM | DIASTOLIC BLOOD PRESSURE: 100 MMHG | TEMPERATURE: 97.3 F | WEIGHT: 240 LBS | HEIGHT: 66 IN

## 2022-08-03 DIAGNOSIS — M79.671 PAIN IN RIGHT FOOT: ICD-10-CM

## 2022-08-03 PROCEDURE — 99213 OFFICE O/P EST LOW 20 MIN: CPT

## 2022-08-03 RX ORDER — AZITHROMYCIN 250 MG/1
250 TABLET, FILM COATED ORAL
Qty: 6 | Refills: 0 | Status: DISCONTINUED | COMMUNITY
Start: 2022-03-20

## 2022-08-03 RX ORDER — HYDROXYCHLOROQUINE SULFATE 200 MG/1
200 TABLET, FILM COATED ORAL TWICE DAILY
Qty: 180 | Refills: 1 | Status: DISCONTINUED | COMMUNITY
Start: 2022-01-18 | End: 2022-08-03

## 2022-08-03 RX ORDER — PREDNISONE 5 MG/1
5 TABLET ORAL
Qty: 45 | Refills: 0 | Status: DISCONTINUED | COMMUNITY
Start: 2021-06-24 | End: 2022-08-03

## 2022-08-04 PROBLEM — M79.671 RIGHT FOOT PAIN: Status: ACTIVE | Noted: 2022-08-04

## 2022-08-04 NOTE — HISTORY OF PRESENT ILLNESS
[FreeTextEntry8] : Pt is a 47 y/o F that presents to the clinic c/o right foot pain and ankle swelling for over a week.  It is painful to touch and when walking. No recent trauma. Does have a lot of chronic issues with her ankles. Previous strains/sprains. \par she does not stand a lot. no frequent exercise. no recent change in activity.

## 2022-08-04 NOTE — PHYSICAL EXAM
[Normal] : no acute distress, well nourished, well developed and well-appearing [de-identified] : right foot - lateral aspect tender to palpation. note mild ankle swelling.

## 2022-08-15 ENCOUNTER — RESULT REVIEW (OUTPATIENT)
Age: 47
End: 2022-08-15

## 2022-08-17 ENCOUNTER — RESULT REVIEW (OUTPATIENT)
Age: 47
End: 2022-08-17

## 2022-08-18 ENCOUNTER — APPOINTMENT (OUTPATIENT)
Dept: RHEUMATOLOGY | Facility: CLINIC | Age: 47
End: 2022-08-18

## 2022-08-24 ENCOUNTER — RX RENEWAL (OUTPATIENT)
Age: 47
End: 2022-08-24

## 2022-09-08 ENCOUNTER — APPOINTMENT (OUTPATIENT)
Dept: GASTROENTEROLOGY | Facility: CLINIC | Age: 47
End: 2022-09-08

## 2022-09-08 VITALS
DIASTOLIC BLOOD PRESSURE: 84 MMHG | HEIGHT: 66 IN | HEART RATE: 70 BPM | WEIGHT: 245 LBS | BODY MASS INDEX: 39.37 KG/M2 | SYSTOLIC BLOOD PRESSURE: 124 MMHG | OXYGEN SATURATION: 98 %

## 2022-09-08 DIAGNOSIS — Z12.11 ENCOUNTER FOR SCREENING FOR MALIGNANT NEOPLASM OF COLON: ICD-10-CM

## 2022-09-08 PROCEDURE — 99204 OFFICE O/P NEW MOD 45 MIN: CPT

## 2022-09-08 NOTE — HISTORY OF PRESENT ILLNESS
[FreeTextEntry1] : 47 year F htn, hypothyroidism, depression, inflammatory joint pain, b12 deficiency on oral supplement, presents for evaluation of colon cancer screening. \par She is seen at the request of Dr. Marlin Boogie. \par \par heartburn a few times a week, takes H2B PRN. she has had this "forever"\par joint pain is better when gluten free. \par she is not a vegetarian , she has not eaten red meat for 16 years. \par \par No prior EGD/colonoscopy\par \par Patient denies abdominal pain , n/v/ dysphagia/odynophagia, change in bowel habits, diarrhea, constipation, brbpr, melena. no weight loss.  Good appetite. Patient has daily BM, denies regular NSAID use. \par \par \par fam hx- negative for colon polyps, colon cancer

## 2022-09-08 NOTE — ASSESSMENT
[FreeTextEntry1] : GERD, chronic , no prior EGD\par Counseled patient on antireflux diet/lifestyle.\par Continue Pepcid PRN\par EGD to r/o esophagitis\par Risks (including but not limited to sedation risks, infection, bleeding, perforation), benefits and alternatives to procedure, including not doing the procedure, were discussed with patient. Patient understood and agreed to proceed with EGD\par EGD preparation instructions reviewed with patient.\par \par B12 deficiency\par -labs as listed\par -EGD with SB bx\par \par Gluten sensitivity-\par -celiac serologies\par \par Screening colonoscopy\par Risks (including but not limited to sedation risks, infection, bleeding, perforation, possibility of missed lesions), benefits and alternatives to procedure, including not doing the procedure, were discussed with patient. Patient understood and agreed to proceed with colonoscopy. \par Colonoscopy preparation instructions reviewed with patient.\par 2 Dulcolax two days prior to procedure + Split MiraLAX/Dulcolax preparation starting day prior to procedure\par

## 2022-09-11 LAB
IGA SER QL IEP: 250 MG/DL
PCA AB SER QL IF: NORMAL

## 2022-09-16 LAB
ENDOMYSIUM IGA SER QL: NEGATIVE
ENDOMYSIUM IGA TITR SER: NORMAL
FOLATE SERPL-MCNC: 9.2 NG/ML
GLIADIN IGA SER QL: <5 UNITS
GLIADIN IGG SER QL: <5 UNITS
GLIADIN PEPTIDE IGA SER-ACNC: NEGATIVE
GLIADIN PEPTIDE IGG SER-ACNC: NEGATIVE
IF BLOCK AB SER QL: 1 AU/ML
TTG IGA SER IA-ACNC: <1.2 U/ML
TTG IGA SER-ACNC: NEGATIVE
TTG IGG SER IA-ACNC: <1.2 U/ML
TTG IGG SER IA-ACNC: NEGATIVE
VIT B12 SERPL-MCNC: 270 PG/ML

## 2022-09-22 ENCOUNTER — RX RENEWAL (OUTPATIENT)
Age: 47
End: 2022-09-22

## 2022-09-27 ENCOUNTER — RX RENEWAL (OUTPATIENT)
Age: 47
End: 2022-09-27

## 2022-10-19 ENCOUNTER — LABORATORY RESULT (OUTPATIENT)
Age: 47
End: 2022-10-19

## 2022-10-21 ENCOUNTER — APPOINTMENT (OUTPATIENT)
Dept: ENDOCRINOLOGY | Facility: CLINIC | Age: 47
End: 2022-10-21

## 2022-10-21 VITALS
HEART RATE: 80 BPM | DIASTOLIC BLOOD PRESSURE: 80 MMHG | WEIGHT: 256 LBS | SYSTOLIC BLOOD PRESSURE: 122 MMHG | BODY MASS INDEX: 41.14 KG/M2 | HEIGHT: 66 IN | OXYGEN SATURATION: 95 %

## 2022-10-21 PROCEDURE — 99214 OFFICE O/P EST MOD 30 MIN: CPT

## 2022-11-26 ENCOUNTER — RESULT REVIEW (OUTPATIENT)
Age: 47
End: 2022-11-26

## 2022-11-28 ENCOUNTER — RESULT REVIEW (OUTPATIENT)
Age: 47
End: 2022-11-28

## 2022-11-29 ENCOUNTER — APPOINTMENT (OUTPATIENT)
Dept: GASTROENTEROLOGY | Facility: HOSPITAL | Age: 47
End: 2022-11-29

## 2022-11-29 ENCOUNTER — RESULT REVIEW (OUTPATIENT)
Age: 47
End: 2022-11-29

## 2022-12-07 DIAGNOSIS — K21.9 GASTRO-ESOPHAGEAL REFLUX DISEASE W/OUT ESOPHAGITIS: ICD-10-CM

## 2022-12-11 NOTE — HISTORY OF PRESENT ILLNESS
[FreeTextEntry1] : prior   Endo- Dr. Douglas ([facility])-4-5 yrs\par        48yo WW f/u for hypothyroidism, Hashimoto thyroiditis, thyroid nodules, \par \par       used to be  on Synthroid LINDA 100mcg qd takes it correctly , more energy since [place] 90mg qd labs reviewed low normal  but still tired \par has had hip pain did not sleep well for the last two nights \par        denies Fhx thyroid cancer\par        denies neck irradiation\par        denies dysphagia, \par        denies dyspnea\par        denies dysphonia \par        7/6/17 right thyroid nodule hypoechoic 1cm FNA negative for malignancy \par        Dr Horton tried to get for her Victoza, Saxenda, either covered by her insurance unless she develops type 2 DM2 and she fails Metformin.\par \par started weight watchers doing well loosing weight

## 2023-01-31 ENCOUNTER — APPOINTMENT (OUTPATIENT)
Dept: INTERNAL MEDICINE | Facility: CLINIC | Age: 48
End: 2023-01-31

## 2023-02-02 ENCOUNTER — NON-APPOINTMENT (OUTPATIENT)
Age: 48
End: 2023-02-02

## 2023-02-02 ENCOUNTER — APPOINTMENT (OUTPATIENT)
Dept: INTERNAL MEDICINE | Facility: CLINIC | Age: 48
End: 2023-02-02
Payer: COMMERCIAL

## 2023-02-02 VITALS
SYSTOLIC BLOOD PRESSURE: 138 MMHG | TEMPERATURE: 97.3 F | DIASTOLIC BLOOD PRESSURE: 82 MMHG | OXYGEN SATURATION: 98 % | BODY MASS INDEX: 40.18 KG/M2 | RESPIRATION RATE: 16 BRPM | HEART RATE: 68 BPM | WEIGHT: 250 LBS | HEIGHT: 66 IN

## 2023-02-02 DIAGNOSIS — Z79.899 OTHER LONG TERM (CURRENT) DRUG THERAPY: ICD-10-CM

## 2023-02-02 DIAGNOSIS — Z01.818 ENCOUNTER FOR OTHER PREPROCEDURAL EXAMINATION: ICD-10-CM

## 2023-02-02 PROCEDURE — 36415 COLL VENOUS BLD VENIPUNCTURE: CPT

## 2023-02-02 PROCEDURE — 93000 ELECTROCARDIOGRAM COMPLETE: CPT

## 2023-02-02 PROCEDURE — 99215 OFFICE O/P EST HI 40 MIN: CPT | Mod: 25

## 2023-02-02 RX ORDER — PHENTERMINE AND TOPIRAMATE 3.75; 23 MG/1; MG/1
3.75-23 CAPSULE, EXTENDED RELEASE ORAL
Qty: 14 | Refills: 0 | Status: DISCONTINUED | COMMUNITY
Start: 2022-10-21 | End: 2023-02-02

## 2023-02-02 RX ORDER — PHENTERMINE AND TOPIRAMATE 7.5; 46 MG/1; MG/1
7.5-46 CAPSULE, EXTENDED RELEASE ORAL
Qty: 90 | Refills: 0 | Status: DISCONTINUED | COMMUNITY
Start: 2022-10-21 | End: 2023-02-02

## 2023-02-03 PROBLEM — Z01.818 PREOP EXAM FOR INTERNAL MEDICINE: Status: ACTIVE | Noted: 2023-02-02

## 2023-02-03 PROBLEM — Z79.899 ENCOUNTER FOR LONG-TERM (CURRENT) USE OF HIGH-RISK MEDICATION: Status: ACTIVE | Noted: 2021-04-14

## 2023-02-03 LAB
25(OH)D3 SERPL-MCNC: 33.1 NG/ML
ALBUMIN SERPL ELPH-MCNC: 4.2 G/DL
ALP BLD-CCNC: 81 U/L
ALT SERPL-CCNC: 31 U/L
ANION GAP SERPL CALC-SCNC: 11 MMOL/L
APTT BLD: 29.5 SEC
AST SERPL-CCNC: 25 U/L
BASOPHILS # BLD AUTO: 0.06 K/UL
BASOPHILS NFR BLD AUTO: 1.3 %
BILIRUB SERPL-MCNC: 0.4 MG/DL
BUN SERPL-MCNC: 11 MG/DL
CALCIUM SERPL-MCNC: 9.6 MG/DL
CHLORIDE SERPL-SCNC: 100 MMOL/L
CO2 SERPL-SCNC: 25 MMOL/L
CREAT SERPL-MCNC: 0.77 MG/DL
EGFR: 94 ML/MIN/1.73M2
EOSINOPHIL # BLD AUTO: 0.08 K/UL
EOSINOPHIL NFR BLD AUTO: 1.7 %
GLUCOSE SERPL-MCNC: 96 MG/DL
HCT VFR BLD CALC: 41.2 %
HGB BLD-MCNC: 13 G/DL
IMM GRANULOCYTES NFR BLD AUTO: 0.2 %
INR PPP: 0.97 RATIO
LYMPHOCYTES # BLD AUTO: 1.51 K/UL
LYMPHOCYTES NFR BLD AUTO: 32.8 %
MAN DIFF?: NORMAL
MCHC RBC-ENTMCNC: 26.7 PG
MCHC RBC-ENTMCNC: 31.6 GM/DL
MCV RBC AUTO: 84.8 FL
MONOCYTES # BLD AUTO: 0.48 K/UL
MONOCYTES NFR BLD AUTO: 10.4 %
NEUTROPHILS # BLD AUTO: 2.47 K/UL
NEUTROPHILS NFR BLD AUTO: 53.6 %
PLATELET # BLD AUTO: 330 K/UL
POTASSIUM SERPL-SCNC: 4.3 MMOL/L
PROT SERPL-MCNC: 7.2 G/DL
PT BLD: 11.3 SEC
RBC # BLD: 4.86 M/UL
RBC # FLD: 14.6 %
SODIUM SERPL-SCNC: 136 MMOL/L
TSH SERPL-ACNC: 3.2 UIU/ML
WBC # FLD AUTO: 4.61 K/UL

## 2023-02-03 NOTE — HISTORY OF PRESENT ILLNESS
[(Patient denies any chest pain, claudication, dyspnea on exertion, orthopnea, palpitations or syncope)] : Patient denies any chest pain, claudication, dyspnea on exertion, orthopnea, palpitations or syncope [Good (7-10 METs)] : Good (7-10 METs) [Aortic Stenosis] : no aortic stenosis [Atrial Fibrillation] : no atrial fibrillation [Coronary Artery Disease] : no coronary artery disease [Recent Myocardial Infarction] : no recent myocardial infarction [Implantable Device/Pacemaker] : no implantable device/pacemaker [Asthma] : no asthma [COPD] : no COPD [Sleep Apnea] : no sleep apnea [Smoker] : not a smoker [Family Member] : no family member with adverse anesthesia reaction/sudden death [Self] : no previous adverse anesthesia reaction [Chronic Anticoagulation] : no chronic anticoagulation [Chronic Kidney Disease] : no chronic kidney disease [Diabetes] : no diabetes [FreeTextEntry1] : left hip replacement  [FreeTextEntry2] : 2/15/23 [FreeTextEntry3] : Dr. Castillo [FreeTextEntry4] : Here for preop left hip replacement. No hx of getting GA but had sedation for colonoscopy. \par No family hx bleeding problems with surgery.\par

## 2023-02-03 NOTE — REVIEW OF SYSTEMS
[Nosebleed] : nosebleed [Postnasal Drip] : postnasal drip [Fever] : no fever [Nasal Discharge] : no nasal discharge [Sore Throat] : no sore throat [Chest Pain] : no chest pain [Palpitations] : no palpitations [Shortness Of Breath] : no shortness of breath [Wheezing] : no wheezing [Cough] : no cough [Abdominal Pain] : no abdominal pain [Nausea] : no nausea [Diarrhea] : diarrhea [Vomiting] : no vomiting [Dysuria] : no dysuria [Hematuria] : no hematuria

## 2023-02-03 NOTE — PHYSICAL EXAM
[No Acute Distress] : no acute distress [Normal Voice/Communication] : normal voice/communication [Normal Sclera/Conjunctiva] : normal sclera/conjunctiva [Normal Oropharynx] : the oropharynx was normal [No Lymphadenopathy] : no lymphadenopathy [Normal] : normal rate, regular rhythm, normal S1 and S2 and no murmur heard [de-identified] : Bilateral TMs enlarged with no exudate.  Small bright blood in right nose.

## 2023-02-03 NOTE — RESULTS/DATA
[] : results reviewed [de-identified] : normal [de-identified] : normal [de-identified] : normal [de-identified] : Normal sinus rhythm

## 2023-03-13 ENCOUNTER — APPOINTMENT (OUTPATIENT)
Dept: ENDOCRINOLOGY | Facility: CLINIC | Age: 48
End: 2023-03-13
Payer: COMMERCIAL

## 2023-03-13 VITALS
BODY MASS INDEX: 39.05 KG/M2 | HEART RATE: 70 BPM | OXYGEN SATURATION: 98 % | HEIGHT: 66 IN | WEIGHT: 243 LBS | DIASTOLIC BLOOD PRESSURE: 78 MMHG | SYSTOLIC BLOOD PRESSURE: 138 MMHG

## 2023-03-13 LAB
ALBUMIN SERPL ELPH-MCNC: 4.3 G/DL
ALP BLD-CCNC: 100 U/L
ALT SERPL-CCNC: 21 U/L
ANION GAP SERPL CALC-SCNC: 11 MMOL/L
AST SERPL-CCNC: 19 U/L
BILIRUB SERPL-MCNC: 0.2 MG/DL
BUN SERPL-MCNC: 17 MG/DL
CALCIUM SERPL-MCNC: 10.2 MG/DL
CHLORIDE SERPL-SCNC: 101 MMOL/L
CHOLEST SERPL-MCNC: 222 MG/DL
CO2 SERPL-SCNC: 25 MMOL/L
CREAT SERPL-MCNC: 0.84 MG/DL
EGFR: 85 ML/MIN/1.73M2
ESTIMATED AVERAGE GLUCOSE: 123 MG/DL
GLUCOSE SERPL-MCNC: 97 MG/DL
HBA1C MFR BLD HPLC: 5.9 %
HDLC SERPL-MCNC: 48 MG/DL
LDLC SERPL CALC-MCNC: 140 MG/DL
NONHDLC SERPL-MCNC: 173 MG/DL
POTASSIUM SERPL-SCNC: 4.4 MMOL/L
PROT SERPL-MCNC: 7.3 G/DL
SODIUM SERPL-SCNC: 137 MMOL/L
T4 FREE SERPL-MCNC: 1 NG/DL
TRIGL SERPL-MCNC: 167 MG/DL
TSH SERPL-ACNC: 6.08 UIU/ML

## 2023-03-13 PROCEDURE — 99215 OFFICE O/P EST HI 40 MIN: CPT | Mod: 25

## 2023-03-13 PROCEDURE — G0447 BEHAVIOR COUNSEL OBESITY 15M: CPT | Mod: 59

## 2023-04-09 ENCOUNTER — RESULT REVIEW (OUTPATIENT)
Age: 48
End: 2023-04-09

## 2023-04-10 ENCOUNTER — RESULT REVIEW (OUTPATIENT)
Age: 48
End: 2023-04-10

## 2023-04-10 ENCOUNTER — APPOINTMENT (OUTPATIENT)
Dept: GASTROENTEROLOGY | Facility: HOSPITAL | Age: 48
End: 2023-04-10

## 2023-04-17 ENCOUNTER — NON-APPOINTMENT (OUTPATIENT)
Age: 48
End: 2023-04-17

## 2023-05-08 ENCOUNTER — APPOINTMENT (OUTPATIENT)
Dept: RHEUMATOLOGY | Facility: CLINIC | Age: 48
End: 2023-05-08

## 2023-05-14 NOTE — HISTORY OF PRESENT ILLNESS
[FreeTextEntry1] : prior   Endo- Dr. Douglas ([facility])-4-5 yrs\par        46yo WW f/u for hypothyroidism, Hashimoto thyroiditis, thyroid nodules, \par \par       used to be  on Synthroid LINDA 100mcg qd takes it correctly , more energy since [place] 90mg qd labs reviewed mildly low still feeling tired\par \par        denies Fhx thyroid cancer\par        denies neck irradiation\par        denies dysphagia, \par        denies dyspnea\par        denies dysphonia \par        7/6/17 right thyroid nodule hypoechoic 1cm FNA negative for malignancy \par        Dr Horton tried to get for her Victoza, Saxenda, either covered by her insurance unless she develops type 2 DM2 and she fails Metformin.\par \par started weight watchers doing well loosing weight

## 2023-06-11 ENCOUNTER — LABORATORY RESULT (OUTPATIENT)
Age: 48
End: 2023-06-11

## 2023-06-13 ENCOUNTER — APPOINTMENT (OUTPATIENT)
Dept: ENDOCRINOLOGY | Facility: CLINIC | Age: 48
End: 2023-06-13
Payer: COMMERCIAL

## 2023-06-13 VITALS
OXYGEN SATURATION: 97 % | HEIGHT: 66 IN | DIASTOLIC BLOOD PRESSURE: 82 MMHG | WEIGHT: 250 LBS | HEART RATE: 68 BPM | SYSTOLIC BLOOD PRESSURE: 120 MMHG | BODY MASS INDEX: 40.18 KG/M2

## 2023-06-13 PROCEDURE — 99215 OFFICE O/P EST HI 40 MIN: CPT | Mod: 25

## 2023-06-13 PROCEDURE — G0447 BEHAVIOR COUNSEL OBESITY 15M: CPT | Mod: 59

## 2023-06-28 ENCOUNTER — APPOINTMENT (OUTPATIENT)
Dept: RHEUMATOLOGY | Facility: CLINIC | Age: 48
End: 2023-06-28
Payer: COMMERCIAL

## 2023-06-28 VITALS
HEIGHT: 66 IN | BODY MASS INDEX: 38.25 KG/M2 | WEIGHT: 238 LBS | SYSTOLIC BLOOD PRESSURE: 120 MMHG | DIASTOLIC BLOOD PRESSURE: 70 MMHG

## 2023-06-28 DIAGNOSIS — M16.7 OTHER UNILATERAL SECONDARY OSTEOARTHRITIS OF HIP: ICD-10-CM

## 2023-06-28 PROCEDURE — 99214 OFFICE O/P EST MOD 30 MIN: CPT

## 2023-06-28 NOTE — HISTORY OF PRESENT ILLNESS
[FreeTextEntry1] : Patient picked up her Plaquenil and after reading the accompanying literature she did not take it. She then tapered off prednisone and then noticed progressive worsening of her joint symptoms. She is here to discussed once again treatment options.

## 2023-06-28 NOTE — PHYSICAL EXAM
[Sclera] : the sclera and conjunctiva were normal [Cervical Lymph Nodes Enlarged Posterior Bilaterally] : posterior cervical [Cervical Lymph Nodes Enlarged Anterior Bilaterally] : anterior cervical [Motor Tone] : muscle strength and tone were normal [FreeTextEntry1] : There is minimal digital pulp atrophy

## 2023-07-17 ENCOUNTER — APPOINTMENT (OUTPATIENT)
Dept: INTERNAL MEDICINE | Facility: CLINIC | Age: 48
End: 2023-07-17
Payer: COMMERCIAL

## 2023-07-17 VITALS
DIASTOLIC BLOOD PRESSURE: 80 MMHG | SYSTOLIC BLOOD PRESSURE: 118 MMHG | HEIGHT: 66 IN | OXYGEN SATURATION: 98 % | WEIGHT: 243 LBS | HEART RATE: 76 BPM | BODY MASS INDEX: 39.05 KG/M2

## 2023-07-17 VITALS
OXYGEN SATURATION: 97 % | HEIGHT: 66 IN | HEART RATE: 84 BPM | SYSTOLIC BLOOD PRESSURE: 128 MMHG | BODY MASS INDEX: 39.21 KG/M2 | DIASTOLIC BLOOD PRESSURE: 80 MMHG | WEIGHT: 244 LBS

## 2023-07-17 DIAGNOSIS — Z00.00 ENCOUNTER FOR GENERAL ADULT MEDICAL EXAMINATION W/OUT ABNORMAL FINDINGS: ICD-10-CM

## 2023-07-17 PROCEDURE — 99396 PREV VISIT EST AGE 40-64: CPT

## 2023-07-17 NOTE — HEALTH RISK ASSESSMENT
[Yes] : Yes [2 - 3 times a week (3 pts)] : 2 - 3  times a week (3 points) [1 or 2 (0 pts)] : 1 or 2 (0 points) [Never (0 pts)] : Never (0 points) [No] : In the past 12 months have you used drugs other than those required for medical reasons? No [No falls in past year] : Patient reported no falls in the past year [0] : 2) Feeling down, depressed, or hopeless: Not at all (0) [PHQ-2 Negative - No further assessment needed] : PHQ-2 Negative - No further assessment needed [Patient reported mammogram was normal] : Patient reported mammogram was normal [Patient reported PAP Smear was normal] : Patient reported PAP Smear was normal [Patient reported colonoscopy was normal] : Patient reported colonoscopy was normal [With Family] : lives with family [# of Members in Household ___] :  household currently consist of [unfilled] member(s) [Employed] : employed [Graduate School] : graduate school [] :  [# Of Children ___] : has [unfilled] children [Feels Safe at Home] : Feels safe at home [Smoke Detector] : smoke detector [Carbon Monoxide Detector] : carbon monoxide detector [Guns at Home] : guns at home [Seat Belt] :  uses seat belt [Sunscreen] : uses sunscreen [Former] : Former [> 15 Years] : > 15 Years [Fair] :  ~his/her~ mood as fair [HIV Test offered] : HIV Test offered [Hepatitis C test offered] : Hepatitis C test offered [de-identified] : socially  [Audit-CScore] : 3 [de-identified] : Walks [de-identified] : Gluten free [[id number]] : 0 [Change in mental status noted] : No change in mental status noted [Language] : denies difficulty with language [Behavior] : denies difficulty with behavior [Learning/Retaining New Information] : denies difficulty learning/retaining new information [Handling Complex Tasks] : denies difficulty handling complex tasks [Reasoning] : denies difficulty with reasoning [Spatial Ability and Orientation] : denies difficulty with spatial ability and orientation [Reports changes in hearing] : Reports no changes in hearing [Reports changes in vision] : Reports no changes in vision [Reports normal functional visual acuity (ie: able to read med bottle)] : Reports poor functional visual acuity.  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

## 2023-08-14 NOTE — ADDENDUM
[FreeTextEntry1] : 6/13/23\par -Med list up to date\par \par Issues/Concerns:\par -states there are no changes - still continues to feel tired and not able to lose weight\par \par Patient also here for nurse visit for education on Ozempic 0.25mg as well as Wegovy 0.25mg/0.5mL. Patient educated on drug indication, administration, common side effects, rotation of sites, medication storage and sharps disposal; provided patient with written information for future reference. Patient verbalized understanding as evidenced by patient teach back method but declined return demonstration by patient provided on demonstration pen injector. Patient provided with number ([phone])  to office in case of questions/concerns.

## 2023-08-14 NOTE — HISTORY OF PRESENT ILLNESS
[FreeTextEntry1] : prior   Endo- Dr. Douglas ([facility])-4-5 yrs        46yo WW f/u for hypothyroidism, Hashimoto thyroiditis, thyroid nodules,         used to be  on Synthroid LINDA 100mcg qd takes it correctly , more energy since [place] 90mg qd labs reviewed mildly low still feeling tired         denies Fhx thyroid cancer        denies neck irradiation        denies dysphagia,         denies dyspnea        denies dysphonia         7/6/17 right thyroid nodule hypoechoic 1cm FNA negative for malignancy         Dr Horton tried to get for her Victoza, Saxenda, either covered by her insurance unless she develops type 2 DM2 and she fails Metformin.  started weight watchers doing well loosing weight

## 2023-09-06 ENCOUNTER — RX RENEWAL (OUTPATIENT)
Age: 48
End: 2023-09-06

## 2023-10-05 ENCOUNTER — APPOINTMENT (OUTPATIENT)
Dept: RHEUMATOLOGY | Facility: CLINIC | Age: 48
End: 2023-10-05
Payer: COMMERCIAL

## 2023-10-05 VITALS
HEIGHT: 66 IN | HEART RATE: 80 BPM | OXYGEN SATURATION: 98 % | BODY MASS INDEX: 39.86 KG/M2 | WEIGHT: 248 LBS | DIASTOLIC BLOOD PRESSURE: 80 MMHG | SYSTOLIC BLOOD PRESSURE: 122 MMHG

## 2023-10-05 PROCEDURE — 99214 OFFICE O/P EST MOD 30 MIN: CPT

## 2023-10-07 ENCOUNTER — RX RENEWAL (OUTPATIENT)
Age: 48
End: 2023-10-07

## 2023-10-10 ENCOUNTER — NON-APPOINTMENT (OUTPATIENT)
Age: 48
End: 2023-10-10

## 2023-10-11 ENCOUNTER — RX RENEWAL (OUTPATIENT)
Age: 48
End: 2023-10-11

## 2023-10-12 ENCOUNTER — APPOINTMENT (OUTPATIENT)
Dept: FAMILY MEDICINE | Facility: CLINIC | Age: 48
End: 2023-10-12
Payer: COMMERCIAL

## 2023-10-12 VITALS
WEIGHT: 243 LBS | HEART RATE: 83 BPM | HEIGHT: 66 IN | BODY MASS INDEX: 39.05 KG/M2 | DIASTOLIC BLOOD PRESSURE: 80 MMHG | SYSTOLIC BLOOD PRESSURE: 110 MMHG | OXYGEN SATURATION: 97 %

## 2023-10-12 DIAGNOSIS — Z09 ENCOUNTER FOR FOLLOW-UP EXAMINATION AFTER COMPLETED TREATMENT FOR CONDITIONS OTHER THAN MALIGNANT NEOPLASM: ICD-10-CM

## 2023-10-12 PROCEDURE — 96372 THER/PROPH/DIAG INJ SC/IM: CPT

## 2023-10-12 PROCEDURE — 99214 OFFICE O/P EST MOD 30 MIN: CPT | Mod: 25

## 2023-10-12 RX ORDER — THYROID, PORCINE 90 MG/1
90 TABLET ORAL
Qty: 90 | Refills: 2 | Status: ACTIVE | COMMUNITY
Start: 2020-08-17 | End: 1900-01-01

## 2023-10-12 RX ORDER — CYANOCOBALAMIN (VITAMIN B-12) 1000 MCG
1000 TABLET ORAL DAILY
Qty: 90 | Refills: 3 | Status: ACTIVE | COMMUNITY
Start: 2023-10-12 | End: 1900-01-01

## 2023-10-16 ENCOUNTER — APPOINTMENT (OUTPATIENT)
Dept: ENDOCRINOLOGY | Facility: CLINIC | Age: 48
End: 2023-10-16

## 2023-10-19 ENCOUNTER — APPOINTMENT (OUTPATIENT)
Dept: INTERNAL MEDICINE | Facility: CLINIC | Age: 48
End: 2023-10-19
Payer: COMMERCIAL

## 2023-10-19 VITALS — TEMPERATURE: 98.9 F

## 2023-10-19 PROCEDURE — 96372 THER/PROPH/DIAG INJ SC/IM: CPT

## 2023-10-25 LAB
ALBUMIN SERPL ELPH-MCNC: 4.6 G/DL
ALP BLD-CCNC: 81 U/L
ALT SERPL-CCNC: 26 U/L
ANION GAP SERPL CALC-SCNC: 10 MMOL/L
AST SERPL-CCNC: 28 U/L
BILIRUB SERPL-MCNC: 0.8 MG/DL
BUN SERPL-MCNC: 11 MG/DL
CALCIUM SERPL-MCNC: 10 MG/DL
CHLORIDE SERPL-SCNC: 101 MMOL/L
CHOLEST SERPL-MCNC: 187 MG/DL
CO2 SERPL-SCNC: 28 MMOL/L
CREAT SERPL-MCNC: 0.85 MG/DL
EGFR: 83 ML/MIN/1.73M2
ESTIMATED AVERAGE GLUCOSE: 117 MG/DL
GLUCOSE SERPL-MCNC: 97 MG/DL
HBA1C MFR BLD HPLC: 5.7 %
HDLC SERPL-MCNC: 61 MG/DL
LDLC SERPL CALC-MCNC: 104 MG/DL
NONHDLC SERPL-MCNC: 126 MG/DL
POTASSIUM SERPL-SCNC: 4.9 MMOL/L
PROT SERPL-MCNC: 7.4 G/DL
SODIUM SERPL-SCNC: 139 MMOL/L
T4 FREE SERPL-MCNC: 1.1 NG/DL
TRIGL SERPL-MCNC: 123 MG/DL
TSH SERPL-ACNC: 4.68 UIU/ML

## 2023-10-26 ENCOUNTER — APPOINTMENT (OUTPATIENT)
Dept: INTERNAL MEDICINE | Facility: CLINIC | Age: 48
End: 2023-10-26
Payer: COMMERCIAL

## 2023-10-26 VITALS — TEMPERATURE: 98.6 F

## 2023-10-26 PROCEDURE — 96372 THER/PROPH/DIAG INJ SC/IM: CPT

## 2023-11-02 ENCOUNTER — APPOINTMENT (OUTPATIENT)
Dept: INTERNAL MEDICINE | Facility: CLINIC | Age: 48
End: 2023-11-02
Payer: COMMERCIAL

## 2023-11-02 VITALS — TEMPERATURE: 98.7 F

## 2023-11-02 PROCEDURE — 96372 THER/PROPH/DIAG INJ SC/IM: CPT

## 2023-11-10 ENCOUNTER — APPOINTMENT (OUTPATIENT)
Dept: INTERNAL MEDICINE | Facility: CLINIC | Age: 48
End: 2023-11-10
Payer: COMMERCIAL

## 2023-11-10 VITALS — TEMPERATURE: 98.5 F

## 2023-11-10 PROCEDURE — 96372 THER/PROPH/DIAG INJ SC/IM: CPT

## 2023-11-17 ENCOUNTER — APPOINTMENT (OUTPATIENT)
Dept: INTERNAL MEDICINE | Facility: CLINIC | Age: 48
End: 2023-11-17
Payer: COMMERCIAL

## 2023-11-17 VITALS — TEMPERATURE: 97.7 F

## 2023-11-17 PROCEDURE — 96372 THER/PROPH/DIAG INJ SC/IM: CPT

## 2023-11-20 ENCOUNTER — RX RENEWAL (OUTPATIENT)
Age: 48
End: 2023-11-20

## 2023-11-20 RX ORDER — ATORVASTATIN CALCIUM 10 MG/1
10 TABLET, FILM COATED ORAL
Qty: 90 | Refills: 3 | Status: ACTIVE | COMMUNITY
Start: 2023-06-13 | End: 1900-01-01

## 2023-11-22 ENCOUNTER — APPOINTMENT (OUTPATIENT)
Dept: INTERNAL MEDICINE | Facility: CLINIC | Age: 48
End: 2023-11-22
Payer: COMMERCIAL

## 2023-11-22 VITALS — TEMPERATURE: 97 F

## 2023-11-22 PROCEDURE — 96372 THER/PROPH/DIAG INJ SC/IM: CPT

## 2023-11-28 ENCOUNTER — APPOINTMENT (OUTPATIENT)
Dept: ENDOCRINOLOGY | Facility: CLINIC | Age: 48
End: 2023-11-28
Payer: COMMERCIAL

## 2023-11-28 VITALS
OXYGEN SATURATION: 95 % | BODY MASS INDEX: 38.57 KG/M2 | SYSTOLIC BLOOD PRESSURE: 110 MMHG | HEART RATE: 74 BPM | WEIGHT: 240 LBS | HEIGHT: 66 IN | DIASTOLIC BLOOD PRESSURE: 72 MMHG

## 2023-11-28 DIAGNOSIS — K90.41 NON-CELIAC GLUTEN SENSITIVITY: ICD-10-CM

## 2023-11-28 DIAGNOSIS — Z13.820 ENCOUNTER FOR SCREENING FOR OSTEOPOROSIS: ICD-10-CM

## 2023-11-28 PROCEDURE — 99215 OFFICE O/P EST HI 40 MIN: CPT

## 2023-11-28 RX ORDER — ASPIRIN 81 MG/1
81 TABLET, COATED ORAL
Qty: 60 | Refills: 0 | Status: DISCONTINUED | COMMUNITY
Start: 2023-02-09 | End: 2023-11-28

## 2023-11-28 RX ORDER — SEMAGLUTIDE 0.25 MG/.5ML
0.25 INJECTION, SOLUTION SUBCUTANEOUS
Qty: 1 | Refills: 0 | Status: DISCONTINUED | COMMUNITY
Start: 2023-06-13 | End: 2023-11-28

## 2023-11-28 RX ORDER — ACETAMINOPHEN 500 MG/1
500 TABLET, FILM COATED ORAL
Qty: 50 | Refills: 0 | Status: DISCONTINUED | COMMUNITY
Start: 2023-02-09 | End: 2023-11-28

## 2023-11-28 RX ORDER — PHENTERMINE HYDROCHLORIDE 8 MG/1
8 TABLET ORAL
Qty: 30 | Refills: 3 | Status: DISCONTINUED | COMMUNITY
Start: 2023-11-28 | End: 2023-11-28

## 2023-11-28 RX ORDER — DOCUSATE SODIUM 100 MG/1
100 CAPSULE, LIQUID FILLED ORAL
Qty: 10 | Refills: 0 | Status: DISCONTINUED | COMMUNITY
Start: 2023-02-09 | End: 2023-11-28

## 2023-11-28 RX ORDER — OMEPRAZOLE 40 MG/1
40 CAPSULE, DELAYED RELEASE ORAL
Qty: 30 | Refills: 0 | Status: DISCONTINUED | COMMUNITY
Start: 2023-02-09 | End: 2023-11-28

## 2023-11-30 ENCOUNTER — APPOINTMENT (OUTPATIENT)
Dept: NEUROLOGY | Facility: CLINIC | Age: 48
End: 2023-11-30

## 2023-12-14 ENCOUNTER — APPOINTMENT (OUTPATIENT)
Dept: INTERNAL MEDICINE | Facility: CLINIC | Age: 48
End: 2023-12-14
Payer: COMMERCIAL

## 2023-12-14 VITALS — TEMPERATURE: 98.1 F

## 2023-12-14 PROCEDURE — 96372 THER/PROPH/DIAG INJ SC/IM: CPT

## 2023-12-19 ENCOUNTER — RX RENEWAL (OUTPATIENT)
Age: 48
End: 2023-12-19

## 2023-12-19 RX ORDER — ERGOCALCIFEROL 1.25 MG/1
1.25 MG CAPSULE, LIQUID FILLED ORAL
Qty: 8 | Refills: 3 | Status: ACTIVE | COMMUNITY
Start: 2020-04-15 | End: 1900-01-01

## 2023-12-21 ENCOUNTER — APPOINTMENT (OUTPATIENT)
Dept: INTERNAL MEDICINE | Facility: CLINIC | Age: 48
End: 2023-12-21
Payer: COMMERCIAL

## 2023-12-21 VITALS — TEMPERATURE: 98 F

## 2023-12-21 PROCEDURE — 96372 THER/PROPH/DIAG INJ SC/IM: CPT

## 2023-12-29 ENCOUNTER — APPOINTMENT (OUTPATIENT)
Dept: INTERNAL MEDICINE | Facility: CLINIC | Age: 48
End: 2023-12-29
Payer: COMMERCIAL

## 2023-12-29 VITALS — TEMPERATURE: 98 F

## 2023-12-29 PROCEDURE — 96372 THER/PROPH/DIAG INJ SC/IM: CPT

## 2024-01-05 ENCOUNTER — APPOINTMENT (OUTPATIENT)
Dept: INTERNAL MEDICINE | Facility: CLINIC | Age: 49
End: 2024-01-05
Payer: COMMERCIAL

## 2024-01-05 VITALS — TEMPERATURE: 98 F

## 2024-01-05 PROCEDURE — 96372 THER/PROPH/DIAG INJ SC/IM: CPT

## 2024-01-08 ENCOUNTER — APPOINTMENT (OUTPATIENT)
Dept: OBGYN | Facility: CLINIC | Age: 49
End: 2024-01-08
Payer: COMMERCIAL

## 2024-01-08 ENCOUNTER — NON-APPOINTMENT (OUTPATIENT)
Age: 49
End: 2024-01-08

## 2024-01-08 VITALS
BODY MASS INDEX: 39.53 KG/M2 | DIASTOLIC BLOOD PRESSURE: 60 MMHG | SYSTOLIC BLOOD PRESSURE: 110 MMHG | WEIGHT: 246 LBS | HEIGHT: 66 IN

## 2024-01-08 DIAGNOSIS — Z12.31 ENCOUNTER FOR SCREENING MAMMOGRAM FOR MALIGNANT NEOPLASM OF BREAST: ICD-10-CM

## 2024-01-08 DIAGNOSIS — Z01.419 ENCOUNTER FOR GYNECOLOGICAL EXAMINATION (GENERAL) (ROUTINE) W/OUT ABNORMAL FINDINGS: ICD-10-CM

## 2024-01-08 DIAGNOSIS — Z00.00 ENCOUNTER FOR GENERAL ADULT MEDICAL EXAMINATION W/OUT ABNORMAL FINDINGS: ICD-10-CM

## 2024-01-08 DIAGNOSIS — R92.30 DENSE BREASTS, UNSPECIFIED: ICD-10-CM

## 2024-01-08 DIAGNOSIS — N84.1 POLYP OF CERVIX UTERI: ICD-10-CM

## 2024-01-08 DIAGNOSIS — N39.3 STRESS INCONTINENCE (FEMALE) (MALE): ICD-10-CM

## 2024-01-08 PROCEDURE — 99386 PREV VISIT NEW AGE 40-64: CPT

## 2024-01-09 PROBLEM — N39.3 STRESS INCONTINENCE, FEMALE: Status: ACTIVE | Noted: 2020-07-15

## 2024-01-09 PROBLEM — Z01.419 WELL WOMAN EXAM WITH ROUTINE GYNECOLOGICAL EXAM: Status: ACTIVE | Noted: 2024-01-08

## 2024-01-09 LAB — HPV HIGH+LOW RISK DNA PNL CVX: NOT DETECTED

## 2024-01-11 ENCOUNTER — APPOINTMENT (OUTPATIENT)
Dept: RHEUMATOLOGY | Facility: CLINIC | Age: 49
End: 2024-01-11
Payer: COMMERCIAL

## 2024-01-11 ENCOUNTER — APPOINTMENT (OUTPATIENT)
Dept: INTERNAL MEDICINE | Facility: CLINIC | Age: 49
End: 2024-01-11
Payer: COMMERCIAL

## 2024-01-11 VITALS
OXYGEN SATURATION: 98 % | DIASTOLIC BLOOD PRESSURE: 60 MMHG | SYSTOLIC BLOOD PRESSURE: 102 MMHG | HEART RATE: 84 BPM | BODY MASS INDEX: 39.53 KG/M2 | WEIGHT: 246 LBS | HEIGHT: 66 IN

## 2024-01-11 VITALS — TEMPERATURE: 98.2 F

## 2024-01-11 DIAGNOSIS — I73.00 RAYNAUD'S SYNDROME W/OUT GANGRENE: ICD-10-CM

## 2024-01-11 DIAGNOSIS — M06.4 INFLAMMATORY POLYARTHROPATHY: ICD-10-CM

## 2024-01-11 PROCEDURE — 99214 OFFICE O/P EST MOD 30 MIN: CPT

## 2024-01-11 PROCEDURE — 96372 THER/PROPH/DIAG INJ SC/IM: CPT

## 2024-01-11 RX ORDER — GABAPENTIN 100 MG/1
100 CAPSULE ORAL
Qty: 60 | Refills: 1 | Status: DISCONTINUED | COMMUNITY
Start: 2023-10-12 | End: 2024-01-11

## 2024-01-11 RX ADMIN — CYANOCOBALAMIN 0 MCG/ML: 1000 INJECTION INTRAMUSCULAR; SUBCUTANEOUS at 00:00

## 2024-01-11 NOTE — PHYSICAL EXAM
[Sclera] : the sclera and conjunctiva were normal [] : no rash [Motor Exam] : the motor exam was normal

## 2024-01-16 LAB — CYTOLOGY CVX/VAG DOC THIN PREP: ABNORMAL

## 2024-01-18 ENCOUNTER — APPOINTMENT (OUTPATIENT)
Dept: INTERNAL MEDICINE | Facility: CLINIC | Age: 49
End: 2024-01-18

## 2024-01-27 ENCOUNTER — LABORATORY RESULT (OUTPATIENT)
Age: 49
End: 2024-01-27

## 2024-01-29 ENCOUNTER — APPOINTMENT (OUTPATIENT)
Dept: ENDOCRINOLOGY | Facility: CLINIC | Age: 49
End: 2024-01-29
Payer: COMMERCIAL

## 2024-01-29 VITALS
SYSTOLIC BLOOD PRESSURE: 112 MMHG | HEIGHT: 66 IN | WEIGHT: 244 LBS | HEART RATE: 76 BPM | OXYGEN SATURATION: 99 % | BODY MASS INDEX: 39.21 KG/M2 | DIASTOLIC BLOOD PRESSURE: 72 MMHG

## 2024-01-29 DIAGNOSIS — E66.09 OTHER OBESITY DUE TO EXCESS CALORIES: ICD-10-CM

## 2024-01-29 PROCEDURE — 99491 CHRNC CARE MGMT PHYS 1ST 30: CPT

## 2024-01-29 PROCEDURE — G0447 BEHAVIOR COUNSEL OBESITY 15M: CPT | Mod: 59

## 2024-01-29 PROCEDURE — 99215 OFFICE O/P EST HI 40 MIN: CPT | Mod: 25

## 2024-01-29 RX ORDER — THYROID, PORCINE 15 MG/1
15 TABLET ORAL
Qty: 90 | Refills: 2 | Status: ACTIVE | COMMUNITY
Start: 2021-09-09 | End: 1900-01-01

## 2024-01-30 NOTE — HISTORY OF PRESENT ILLNESS
[FreeTextEntry1] : prior   Endo- Dr. Douglas ([facility])-4-5 yrs        47yo WW f/u for hypothyroidism, Hashimoto thyroiditis, thyroid nodules, weight issues         used to be  on Synthroid LINDA 100mcg qd takes it correctly , more energy since [place] 90mg qd labs reviewed mildly low still feeling tired         denies Fhx thyroid cancer        denies neck irradiation        denies dysphagia,         denies dyspnea        denies dysphonia         7/6/17 right thyroid nodule hypoechoic 1cm FNA negative for malignancy         Dr Horton tried to get for her Victoza, Saxenda, either covered by her insurance unless she develops type 2 DM2 and she fails Metformin.  started weight watchers doing well loosing weight   1/29/24 follow up for Thyroid and weight management: Pt never started the [place] 8mg because of the holidays. She has an appointment with her neurologist tomorrow for extremity weakness. Plan to start it after the neurologist visit.   She is currently on: Thyroid [place] 105mg VD 1.25 mg, 2 times a week B12 1000 MCG daily and the shots once a week since October. The numbness has been improving with the B12.    Reports being more active than before.

## 2024-01-30 NOTE — ADDENDUM
[FreeTextEntry1] : 6/13/23\par  -Med list up to date\par  \par  Issues/Concerns:\par  -states there are no changes - still continues to feel tired and not able to lose weight\par  \par  Patient also here for nurse visit for education on Ozempic 0.25mg as well as Wegovy 0.25mg/0.5mL. Patient educated on drug indication, administration, common side effects, rotation of sites, medication storage and sharps disposal; provided patient with written information for future reference. Patient verbalized understanding as evidenced by patient teach back method but declined return demonstration by patient provided on demonstration pen injector. Patient provided with number ([phone])  to office in case of questions/concerns.

## 2024-01-30 NOTE — PHYSICAL EXAM
[Alert] : alert [Well Nourished] : well nourished [Obese] : obese [No Acute Distress] : no acute distress [Well Developed] : well developed [Normal Sclera/Conjunctiva] : normal sclera/conjunctiva [EOMI] : extra ocular movement intact [No Proptosis] : no proptosis [Normal Oropharynx] : the oropharynx was normal [No Thyroid Nodules] : no palpable thyroid nodules [No Respiratory Distress] : no respiratory distress [No Accessory Muscle Use] : no accessory muscle use [Clear to Auscultation] : lungs were clear to auscultation bilaterally [Normal S1, S2] : normal S1 and S2 [Normal Rate] : heart rate was normal [Regular Rhythm] : with a regular rhythm [No Edema] : no peripheral edema [Pedal Pulses Normal] : the pedal pulses are present [Normal Bowel Sounds] : normal bowel sounds [Not Tender] : non-tender [Not Distended] : not distended [Soft] : abdomen soft [Normal Anterior Cervical Nodes] : no anterior cervical lymphadenopathy [No Spinal Tenderness] : no spinal tenderness [Spine Straight] : spine straight [No Stigmata of Cushings Syndrome] : no stigmata of Cushings Syndrome [Normal Gait] : normal gait [Normal Strength/Tone] : muscle strength and tone were normal [No Rash] : no rash [Normal Reflexes] : deep tendon reflexes were 2+ and symmetric [No Tremors] : no tremors [Oriented x3] : oriented to person, place, and time [Acanthosis Nigricans] : no acanthosis nigricans [de-identified] : Mildly enlarged, no nodules.

## 2024-01-30 NOTE — REVIEW OF SYSTEMS
[Fatigue] : fatigue [Recent Weight Gain (___ Lbs)] : recent weight gain: [unfilled] lbs [Joint Pain] : joint pain [Back Pain] : back pain [Headaches] : headaches [Insomnia] : insomnia [Anxiety] : anxiety [Negative] : Heme/Lymph [Pain/Numbness of Digits] : pain/numbness of digits [Depression] : no depression [Stress] : no stress [de-identified] : Bilateral extremities numbness.

## 2024-01-31 ENCOUNTER — APPOINTMENT (OUTPATIENT)
Dept: INTERNAL MEDICINE | Facility: CLINIC | Age: 49
End: 2024-01-31
Payer: COMMERCIAL

## 2024-01-31 VITALS — TEMPERATURE: 98 F

## 2024-01-31 PROCEDURE — 96372 THER/PROPH/DIAG INJ SC/IM: CPT

## 2024-01-31 RX ADMIN — CYANOCOBALAMIN 0 MCG/ML: 1000 INJECTION INTRAMUSCULAR; SUBCUTANEOUS at 00:00

## 2024-02-07 ENCOUNTER — APPOINTMENT (OUTPATIENT)
Dept: INTERNAL MEDICINE | Facility: CLINIC | Age: 49
End: 2024-02-07

## 2024-02-15 ENCOUNTER — APPOINTMENT (OUTPATIENT)
Dept: INTERNAL MEDICINE | Facility: CLINIC | Age: 49
End: 2024-02-15

## 2024-03-14 ENCOUNTER — RX RENEWAL (OUTPATIENT)
Age: 49
End: 2024-03-14

## 2024-03-14 RX ORDER — LOSARTAN POTASSIUM 50 MG/1
50 TABLET, FILM COATED ORAL
Qty: 90 | Refills: 3 | Status: ACTIVE | COMMUNITY
Start: 2020-03-02 | End: 1900-01-01

## 2024-04-15 ENCOUNTER — APPOINTMENT (OUTPATIENT)
Dept: ENDOCRINOLOGY | Facility: CLINIC | Age: 49
End: 2024-04-15

## 2024-04-15 ENCOUNTER — APPOINTMENT (OUTPATIENT)
Dept: ENDOCRINOLOGY | Facility: CLINIC | Age: 49
End: 2024-04-15
Payer: COMMERCIAL

## 2024-04-15 DIAGNOSIS — R79.89 OTHER SPECIFIED ABNORMAL FINDINGS OF BLOOD CHEMISTRY: ICD-10-CM

## 2024-04-15 DIAGNOSIS — E78.5 HYPERLIPIDEMIA, UNSPECIFIED: ICD-10-CM

## 2024-04-15 DIAGNOSIS — R73.03 PREDIABETES.: ICD-10-CM

## 2024-04-15 DIAGNOSIS — E53.8 DEFICIENCY OF OTHER SPECIFIED B GROUP VITAMINS: ICD-10-CM

## 2024-04-15 DIAGNOSIS — E55.9 VITAMIN D DEFICIENCY, UNSPECIFIED: ICD-10-CM

## 2024-04-15 DIAGNOSIS — E04.1 NONTOXIC SINGLE THYROID NODULE: ICD-10-CM

## 2024-04-15 LAB
ALBUMIN SERPL ELPH-MCNC: 4.5 G/DL
ALP BLD-CCNC: 79 U/L
ALT SERPL-CCNC: 20 U/L
ANION GAP SERPL CALC-SCNC: 15 MMOL/L
AST SERPL-CCNC: 21 U/L
BILIRUB SERPL-MCNC: 0.5 MG/DL
BUN SERPL-MCNC: 12 MG/DL
CALCIUM SERPL-MCNC: 9.9 MG/DL
CHLORIDE SERPL-SCNC: 100 MMOL/L
CHOLEST SERPL-MCNC: 169 MG/DL
CO2 SERPL-SCNC: 24 MMOL/L
CREAT SERPL-MCNC: 0.93 MG/DL
EGFR: 75 ML/MIN/1.73M2
ESTIMATED AVERAGE GLUCOSE: 120 MG/DL
FOLATE SERPL-MCNC: >20 NG/ML
GLUCOSE SERPL-MCNC: 113 MG/DL
HBA1C MFR BLD HPLC: 5.8 %
HDLC SERPL-MCNC: 71 MG/DL
LDLC SERPL CALC-MCNC: 86 MG/DL
NONHDLC SERPL-MCNC: 98 MG/DL
POTASSIUM SERPL-SCNC: 4.7 MMOL/L
PROT SERPL-MCNC: 7.4 G/DL
SODIUM SERPL-SCNC: 139 MMOL/L
T4 FREE SERPL-MCNC: 0.9 NG/DL
TRIGL SERPL-MCNC: 61 MG/DL
TSH SERPL-ACNC: 2.11 UIU/ML
VIT B12 SERPL-MCNC: 710 PG/ML

## 2024-04-15 PROCEDURE — 99214 OFFICE O/P EST MOD 30 MIN: CPT | Mod: 95

## 2024-04-15 NOTE — REVIEW OF SYSTEMS
[Fatigue] : fatigue [Recent Weight Gain (___ Lbs)] : recent weight gain: [unfilled] lbs [Joint Pain] : joint pain [Back Pain] : back pain [Headaches] : headaches [Insomnia] : insomnia [Anxiety] : anxiety [Negative] : Heme/Lymph [Pain/Numbness of Digits] : pain/numbness of digits [Depression] : no depression [Stress] : no stress [de-identified] : Left foot numbness.

## 2024-04-15 NOTE — HISTORY OF PRESENT ILLNESS
[Home] : at home, [unfilled] , at the time of the visit. [Medical Office: ([facility])___] : at the medical office located in  [Verbal consent obtained from patient] : the patient, [unfilled] [FreeTextEntry1] : prior   Endo- Dr. Douglas ([facility])- 4-5 years        47yo WW f/u for hypothyroidism, Hashimoto thyroiditis, thyroid nodules, weight issues         used to be  on Synthroid LINDA 100mcg qd takes it correctly, more energy since [place] 90mg qd labs reviewed mildly low still feeling tired         denies Fhx thyroid cancer        denies neck irradiation        denies dysphagia,         denies dyspnea        denies dysphonia         7/6/17 right thyroid nodule hypoechoic 1cm FNA negative for malignancy         Dr Horton tried to get for her Victoza, Saxenda, either covered by her insurance unless she develops type 2 DM2 and she fails Metformin.  started weight watchers doing well losing weight.   4/15/24 telehealth follow up-  Left foot numbness has gotten worse and is spreading over the last few days per pt. Went to primary care and recommended vitamin A shots, which she states have not helped her at all.  Last B12 injection was 2 months ago. She has been taking B- complex vitamin and her level was good.  Reports increased stress lately and has not been eating as well.  A1c was 5.8%. Lipid panel was at goal.

## 2024-04-15 NOTE — END OF VISIT
[Time Spent: ___ minutes] : I have spent [unfilled] minutes of time on the encounter. [FreeTextEntry3] :  I, Aidan Castillo, am scribing for and in the presence of Dr. Jaylene Prasad in the following sections: HISTORY OF PRESENT ILLNESS; REVIEW OF SYSTEMS; PHYSICAL EXAM; ASSESSMENT/ PLAN. I, Jaylene Prasad, personally performed the services described in the documentation, reviewed the documentation recorded by the scribe in my presence, and it accurately and completely records my words and actions. 4/15/2024.

## 2024-04-15 NOTE — PHYSICAL EXAM
[Alert] : alert [Well Nourished] : well nourished [Obese] : obese [No Acute Distress] : no acute distress [Well Developed] : well developed [Normal Sclera/Conjunctiva] : normal sclera/conjunctiva [EOMI] : extra ocular movement intact [No Proptosis] : no proptosis [Normal Oropharynx] : the oropharynx was normal [Thyroid Not Enlarged] : the thyroid was not enlarged [No Thyroid Nodules] : no palpable thyroid nodules [No Respiratory Distress] : no respiratory distress [No Accessory Muscle Use] : no accessory muscle use [Clear to Auscultation] : lungs were clear to auscultation bilaterally [Normal S1, S2] : normal S1 and S2 [Normal Rate] : heart rate was normal [Regular Rhythm] : with a regular rhythm [No Edema] : no peripheral edema [Normal Bowel Sounds] : normal bowel sounds [Not Tender] : non-tender [Not Distended] : not distended [Soft] : abdomen soft [Normal Anterior Cervical Nodes] : no anterior cervical lymphadenopathy [No Spinal Tenderness] : no spinal tenderness [Spine Straight] : spine straight [No Stigmata of Cushings Syndrome] : no stigmata of Cushings Syndrome [Normal Gait] : normal gait [Normal Strength/Tone] : muscle strength and tone were normal [No Rash] : no rash [Normal Reflexes] : deep tendon reflexes were 2+ and symmetric [No Tremors] : no tremors [Oriented x3] : oriented to person, place, and time [Acanthosis Nigricans] : no acanthosis nigricans

## 2024-04-17 ENCOUNTER — RX RENEWAL (OUTPATIENT)
Age: 49
End: 2024-04-17

## 2024-04-17 RX ORDER — HYDROXYCHLOROQUINE SULFATE 200 MG/1
200 TABLET, FILM COATED ORAL
Qty: 180 | Refills: 1 | Status: ACTIVE | COMMUNITY
Start: 2023-06-28 | End: 1900-01-01

## 2024-06-04 ENCOUNTER — NON-APPOINTMENT (OUTPATIENT)
Age: 49
End: 2024-06-04

## 2024-06-04 ENCOUNTER — APPOINTMENT (OUTPATIENT)
Dept: INTERNAL MEDICINE | Facility: CLINIC | Age: 49
End: 2024-06-04
Payer: COMMERCIAL

## 2024-06-04 ENCOUNTER — RESULT REVIEW (OUTPATIENT)
Age: 49
End: 2024-06-04

## 2024-06-04 ENCOUNTER — RX RENEWAL (OUTPATIENT)
Age: 49
End: 2024-06-04

## 2024-06-04 VITALS
WEIGHT: 253.5 LBS | BODY MASS INDEX: 40.74 KG/M2 | HEART RATE: 71 BPM | SYSTOLIC BLOOD PRESSURE: 122 MMHG | HEIGHT: 66 IN | TEMPERATURE: 98.1 F | DIASTOLIC BLOOD PRESSURE: 86 MMHG | OXYGEN SATURATION: 98 %

## 2024-06-04 DIAGNOSIS — E03.9 HYPOTHYROIDISM, UNSPECIFIED: ICD-10-CM

## 2024-06-04 DIAGNOSIS — Z01.818 ENCOUNTER FOR OTHER PREPROCEDURAL EXAMINATION: ICD-10-CM

## 2024-06-04 DIAGNOSIS — I10 ESSENTIAL (PRIMARY) HYPERTENSION: ICD-10-CM

## 2024-06-04 PROCEDURE — 36415 COLL VENOUS BLD VENIPUNCTURE: CPT

## 2024-06-04 PROCEDURE — 93000 ELECTROCARDIOGRAM COMPLETE: CPT

## 2024-06-04 PROCEDURE — 99214 OFFICE O/P EST MOD 30 MIN: CPT

## 2024-06-04 RX ORDER — PANTOPRAZOLE 40 MG/1
40 TABLET, DELAYED RELEASE ORAL
Qty: 30 | Refills: 6 | Status: ACTIVE | COMMUNITY
Start: 2022-12-07 | End: 1900-01-01

## 2024-06-05 LAB
ANION GAP SERPL CALC-SCNC: 13 MMOL/L
APTT BLD: 31.7 SEC
BUN SERPL-MCNC: 14 MG/DL
CALCIUM SERPL-MCNC: 9.3 MG/DL
CHLORIDE SERPL-SCNC: 101 MMOL/L
CO2 SERPL-SCNC: 24 MMOL/L
CREAT SERPL-MCNC: 0.96 MG/DL
EGFR: 72 ML/MIN/1.73M2
GLUCOSE SERPL-MCNC: 95 MG/DL
HCT VFR BLD CALC: 40.6 %
HGB BLD-MCNC: 12.9 G/DL
INR PPP: 0.91 RATIO
MCHC RBC-ENTMCNC: 26.2 PG
MCHC RBC-ENTMCNC: 31.8 GM/DL
MCV RBC AUTO: 82.4 FL
PLATELET # BLD AUTO: 306 K/UL
POTASSIUM SERPL-SCNC: 4.3 MMOL/L
PT BLD: 10.3 SEC
RBC # BLD: 4.93 M/UL
RBC # FLD: 16.6 %
SODIUM SERPL-SCNC: 138 MMOL/L
WBC # FLD AUTO: 5.03 K/UL

## 2024-06-05 NOTE — HISTORY OF PRESENT ILLNESS
[No Pertinent Cardiac History] : no history of aortic stenosis, atrial fibrillation, coronary artery disease, recent myocardial infarction, or implantable device/pacemaker [No Pertinent Pulmonary History] : no history of asthma, COPD, sleep apnea, or smoking [No Adverse Anesthesia Reaction] : no adverse anesthesia reaction in self or family member [(Patient denies any chest pain, claudication, dyspnea on exertion, orthopnea, palpitations or syncope)] : Patient denies any chest pain, claudication, dyspnea on exertion, orthopnea, palpitations or syncope [Moderate (4-6 METs)] : Moderate (4-6 METs) [Chronic Anticoagulation] : no chronic anticoagulation [Chronic Kidney Disease] : no chronic kidney disease [Diabetes] : no diabetes [FreeTextEntry4] : 48 year female came for preop exam planning R carpal tunnel release surgery with Dr. Peewee Aquino fax [phone] Pt is seen first time has h/o HTN, Hypothyroidism, currently pt feels well, denies fever, headache, chest pain, palpitations, shortness of breath. Pt denies h/o Heart ds, can walk 10 blocks tolerated previous surgery well.  [FreeTextEntry7] : Today's EKG unremarkable

## 2024-06-05 NOTE — ADDENDUM
[FreeTextEntry1] :  recent lab work unremarkable, no contraindications for planned surgery please see above full assessment

## 2024-06-18 ENCOUNTER — APPOINTMENT (OUTPATIENT)
Dept: ENDOCRINOLOGY | Facility: CLINIC | Age: 49
End: 2024-06-18
Payer: COMMERCIAL

## 2024-06-18 DIAGNOSIS — R20.0 ANESTHESIA OF SKIN: ICD-10-CM

## 2024-06-18 LAB
THYROGLOB AB SERPL-ACNC: 9.5 IU/ML
THYROPEROXIDASE AB SERPL IA-ACNC: 268 IU/ML

## 2024-06-18 PROCEDURE — 99215 OFFICE O/P EST HI 40 MIN: CPT

## 2024-06-18 RX ORDER — PHENTERMINE HYDROCHLORIDE 8 MG/1
8 TABLET ORAL
Qty: 30 | Refills: 3 | Status: DISCONTINUED | COMMUNITY
Start: 2023-11-28 | End: 2024-06-18

## 2024-06-18 RX ORDER — NALTREXONE HYDROCHLORIDE AND BUPROPION HYDROCHLORIDE 8; 90 MG/1; MG/1
8-90 TABLET, EXTENDED RELEASE ORAL
Qty: 120 | Refills: 3 | Status: ACTIVE | COMMUNITY
Start: 2024-06-18 | End: 1900-01-01

## 2024-06-18 NOTE — PHYSICAL EXAM
[Alert] : alert [Well Nourished] : well nourished [Healthy Appearance] : healthy appearance [Obese] : obese [No Acute Distress] : no acute distress [Well Developed] : well developed [Normal Voice/Communication] : normal voice communication [Normal Sclera/Conjunctiva] : normal sclera/conjunctiva [Normal Optic Discs] : normal optic discs [No Lid Lag] : no lid lag [Normal Outer Ear/Nose] : the ears and nose were normal in appearance [Normal Hearing] : hearing was normal [Normal Lips/Gums] : the lips and gums were normal [No Accessory Muscle Use] : no accessory muscle use [No Respiratory Distress] : no respiratory distress [Oriented x3] : oriented to person, place, and time [Normal Affect] : the affect was normal [Normal Insight/Judgement] : insight and judgment were intact [Normal Mood] : the mood was normal

## 2024-06-18 NOTE — REVIEW OF SYSTEMS
[Fatigue] : fatigue [Recent Weight Gain (___ Lbs)] : recent weight gain: [unfilled] lbs [Joint Pain] : joint pain [Back Pain] : back pain [Headaches] : headaches [Pain/Numbness of Digits] : pain/numbness of digits [Insomnia] : insomnia [Anxiety] : anxiety [Negative] : Heme/Lymph [Depression] : no depression [Stress] : no stress [de-identified] : Left foot numbness.

## 2024-06-18 NOTE — HISTORY OF PRESENT ILLNESS
[Home] : at home, [unfilled] , at the time of the visit. [Medical Office: ([facility])___] : at the medical office located in  [Verbal consent obtained from patient] : the patient, [unfilled] [FreeTextEntry1] : prior   Endo- Dr. Douglas ([facility])- 4-5 years        49yo WW f/u for hypothyroidism, Hashimoto thyroiditis, thyroid nodules, weight issues         used to be  on Synthroid LINDA 100mcg qd takes it correctly, more energy since [place] 90mg qd labs reviewed mildly low still feeling tired         denies Fhx thyroid cancer        denies neck irradiation        denies dysphagia,         denies dyspnea        denies dysphonia         7/6/17 right thyroid nodule hypoechoic 1cm FNA negative for malignancy         Dr Horton tried to get for her Victoza, Saxenda, either covered by her insurance unless she develops type 2 DM2 and she fails Metformin.  started weight watchers doing well losing weight.   4/15/24 telehealth follow up-  Left foot numbness has gotten worse and is spreading over the last few days per pt. Went to primary care and recommended vitamin A shots, which she states have not helped her at all.  Last B12 injection was 2 months ago. She has been taking B- complex vitamin and her level was good.  Reports increased stress lately and has not been eating as well.  A1c was 5.8%. Lipid panel was at goal.  6/18/24 telehealth follow up-  Pt calling today to discuss alternative weight loss medication options. She has continued to gain weight.  We sent Wegovy in November, but insurance never covered.  She never started phentermine. She never started Qsymia either as she was afraid that her anxiety and high BP would get worse.  Reports Cymbalta was not working anymore on its own and was started on bupropion 150 mg. Taking both right now and feeling better mentally.  S/p carpal tunnel surgery recently.

## 2024-06-18 NOTE — END OF VISIT
[FreeTextEntry3] : All medical record entries made by the Scribe were at my, YOLANDA HILLMAN, direction and personally dictated by me on 6/18/2024. I have reviewed the chart and agree that the record accurately reflects my personal performance of the history, physical exam, assessment and plan. I have also personally directed, reviewed, and agreed with the chart.   Documented by Aidan Castillo acting as a scribe for YOLANDA HILLMAN on 6/18/2024. [Time Spent: ___ minutes] : I have spent [unfilled] minutes of time on the encounter.

## 2024-07-15 ENCOUNTER — RESULT REVIEW (OUTPATIENT)
Age: 49
End: 2024-07-15

## 2024-07-16 ENCOUNTER — APPOINTMENT (OUTPATIENT)
Dept: ENDOCRINOLOGY | Facility: CLINIC | Age: 49
End: 2024-07-16
Payer: COMMERCIAL

## 2024-07-16 VITALS
OXYGEN SATURATION: 96 % | WEIGHT: 252 LBS | HEIGHT: 66 IN | HEART RATE: 74 BPM | DIASTOLIC BLOOD PRESSURE: 70 MMHG | BODY MASS INDEX: 40.5 KG/M2 | SYSTOLIC BLOOD PRESSURE: 120 MMHG

## 2024-07-16 DIAGNOSIS — R79.89 OTHER SPECIFIED ABNORMAL FINDINGS OF BLOOD CHEMISTRY: ICD-10-CM

## 2024-07-16 DIAGNOSIS — E03.9 HYPOTHYROIDISM, UNSPECIFIED: ICD-10-CM

## 2024-07-16 DIAGNOSIS — E78.5 HYPERLIPIDEMIA, UNSPECIFIED: ICD-10-CM

## 2024-07-16 DIAGNOSIS — E04.1 NONTOXIC SINGLE THYROID NODULE: ICD-10-CM

## 2024-07-16 DIAGNOSIS — E66.9 OBESITY, UNSPECIFIED: ICD-10-CM

## 2024-07-16 DIAGNOSIS — N95.1 MENOPAUSAL AND FEMALE CLIMACTERIC STATES: ICD-10-CM

## 2024-07-16 LAB
25(OH)D3 SERPL-MCNC: 29 NG/ML
CHOLEST SERPL-MCNC: 177 MG/DL
ESTIMATED AVERAGE GLUCOSE: 123 MG/DL
FOLATE SERPL-MCNC: 11.8 NG/ML
HBA1C MFR BLD HPLC: 5.9 %
HDLC SERPL-MCNC: 65 MG/DL
LDLC SERPL CALC-MCNC: 98 MG/DL
NONHDLC SERPL-MCNC: 113 MG/DL
T4 FREE SERPL-MCNC: 1 NG/DL
TRIGL SERPL-MCNC: 81 MG/DL
TSH SERPL-ACNC: 2.33 UIU/ML
VIT B12 SERPL-MCNC: 522 PG/ML

## 2024-07-16 PROCEDURE — 99215 OFFICE O/P EST HI 40 MIN: CPT

## 2024-08-02 PROBLEM — E06.3 HYPOTHYROIDISM DUE TO HASHIMOTO'S THYROIDITIS: Status: RESOLVED | Noted: 2018-11-15 | Resolved: 2024-08-02

## 2024-08-23 ENCOUNTER — APPOINTMENT (OUTPATIENT)
Dept: OBGYN | Facility: CLINIC | Age: 49
End: 2024-08-23
Payer: COMMERCIAL

## 2024-08-23 VITALS
HEIGHT: 66 IN | BODY MASS INDEX: 39.86 KG/M2 | DIASTOLIC BLOOD PRESSURE: 90 MMHG | SYSTOLIC BLOOD PRESSURE: 120 MMHG | WEIGHT: 248 LBS

## 2024-08-23 DIAGNOSIS — Z87.42 OTHER SPECIFIED POSTPROCEDURAL STATES: ICD-10-CM

## 2024-08-23 DIAGNOSIS — N84.1 POLYP OF CERVIX UTERI: ICD-10-CM

## 2024-08-23 DIAGNOSIS — Z98.890 OTHER SPECIFIED POSTPROCEDURAL STATES: ICD-10-CM

## 2024-08-23 PROCEDURE — 57500 BIOPSY OF CERVIX: CPT

## 2024-08-29 LAB — CORE LAB BIOPSY: NORMAL

## 2024-09-03 ENCOUNTER — APPOINTMENT (OUTPATIENT)
Dept: RHEUMATOLOGY | Facility: CLINIC | Age: 49
End: 2024-09-03

## 2024-09-11 ENCOUNTER — RX RENEWAL (OUTPATIENT)
Age: 49
End: 2024-09-11

## 2024-09-20 ENCOUNTER — APPOINTMENT (OUTPATIENT)
Dept: OBGYN | Facility: CLINIC | Age: 49
End: 2024-09-20
Payer: COMMERCIAL

## 2024-09-20 DIAGNOSIS — N84.1 POLYP OF CERVIX UTERI: ICD-10-CM

## 2024-09-20 DIAGNOSIS — Z87.42 OTHER SPECIFIED POSTPROCEDURAL STATES: ICD-10-CM

## 2024-09-20 DIAGNOSIS — Z98.890 OTHER SPECIFIED POSTPROCEDURAL STATES: ICD-10-CM

## 2024-09-20 PROCEDURE — 99441: CPT

## 2024-09-26 ENCOUNTER — APPOINTMENT (OUTPATIENT)
Dept: INTERNAL MEDICINE | Facility: CLINIC | Age: 49
End: 2024-09-26
Payer: COMMERCIAL

## 2024-09-26 VITALS
HEART RATE: 68 BPM | OXYGEN SATURATION: 97 % | DIASTOLIC BLOOD PRESSURE: 80 MMHG | HEIGHT: 66 IN | BODY MASS INDEX: 41.62 KG/M2 | SYSTOLIC BLOOD PRESSURE: 130 MMHG | WEIGHT: 259 LBS

## 2024-09-26 DIAGNOSIS — Z01.818 ENCOUNTER FOR OTHER PREPROCEDURAL EXAMINATION: ICD-10-CM

## 2024-09-26 DIAGNOSIS — G56.00 CARPAL TUNNEL SYNDROME, UNSPECIFIED UPPER LIMB: ICD-10-CM

## 2024-09-26 PROCEDURE — 99213 OFFICE O/P EST LOW 20 MIN: CPT

## 2024-09-26 PROCEDURE — 36415 COLL VENOUS BLD VENIPUNCTURE: CPT

## 2024-09-27 LAB
ALBUMIN SERPL ELPH-MCNC: 4.4 G/DL
ALP BLD-CCNC: 95 U/L
ALT SERPL-CCNC: 18 U/L
ANION GAP SERPL CALC-SCNC: 16 MMOL/L
AST SERPL-CCNC: 20 U/L
BASOPHILS # BLD AUTO: 0.06 K/UL
BASOPHILS NFR BLD AUTO: 1.2 %
BILIRUB SERPL-MCNC: 0.4 MG/DL
BUN SERPL-MCNC: 11 MG/DL
CALCIUM SERPL-MCNC: 9.7 MG/DL
CHLORIDE SERPL-SCNC: 101 MMOL/L
CO2 SERPL-SCNC: 26 MMOL/L
CREAT SERPL-MCNC: 0.92 MG/DL
EGFR: 75 ML/MIN/1.73M2
EOSINOPHIL # BLD AUTO: 0.06 K/UL
EOSINOPHIL NFR BLD AUTO: 1.2 %
GLUCOSE SERPL-MCNC: 74 MG/DL
HCT VFR BLD CALC: 41.2 %
HGB BLD-MCNC: 12.8 G/DL
IMM GRANULOCYTES NFR BLD AUTO: 0.2 %
LYMPHOCYTES # BLD AUTO: 1.45 K/UL
LYMPHOCYTES NFR BLD AUTO: 29.2 %
MAN DIFF?: NORMAL
MCHC RBC-ENTMCNC: 25.9 PG
MCHC RBC-ENTMCNC: 31.1 GM/DL
MCV RBC AUTO: 83.4 FL
MONOCYTES # BLD AUTO: 0.54 K/UL
MONOCYTES NFR BLD AUTO: 10.9 %
NEUTROPHILS # BLD AUTO: 2.85 K/UL
NEUTROPHILS NFR BLD AUTO: 57.3 %
PLATELET # BLD AUTO: 292 K/UL
POTASSIUM SERPL-SCNC: 4.3 MMOL/L
PROT SERPL-MCNC: 7.2 G/DL
RBC # BLD: 4.94 M/UL
RBC # FLD: 15.6 %
SODIUM SERPL-SCNC: 143 MMOL/L
WBC # FLD AUTO: 4.97 K/UL

## 2024-09-27 NOTE — ASSESSMENT
[High Risk Surgery - Intraperitoneal, Intrathoracic or Supringuinal Vascular Procedures] : High Risk Surgery - Intraperitoneal, Intrathoracic or Supringuinal Vascular Procedures - No (0) [Ischemic Heart Disease] : Ischemic Heart Disease - No (0) [Congestive Heart Failure] : Congestive Heart Failure - No (0) [Prior Cerebrovascular Accident or TIA] : Prior Cerebrovascular Accident or TIA - No (0) [Creatinine >= 2mg/dL (1 Point)] : Creatinine >= 2mg/dL - No (0) [Insulin-dependent Diabetic (1 Point)] : Insulin-dependent Diabetic - No (0) [0] : 0 , RCRI Class: I, Risk of Post-Op Cardiac Complications: 3.9%, 95% CI for Risk Estimate: 2.8% - 5.4% [Patient Optimized for Surgery] : Patient optimized for surgery [FreeTextEntry4] : Can continue meds prior to surgery ([place] thyroid, Losartan in AM) Hold off on starting Contrave until after surgery

## 2024-09-27 NOTE — ASSESSMENT
[High Risk Surgery - Intraperitoneal, Intrathoracic or Supringuinal Vascular Procedures] : High Risk Surgery - Intraperitoneal, Intrathoracic or Supringuinal Vascular Procedures - No (0) [Ischemic Heart Disease] : Ischemic Heart Disease - No (0) [Congestive Heart Failure] : Congestive Heart Failure - No (0) [Prior Cerebrovascular Accident or TIA] : Prior Cerebrovascular Accident or TIA - No (0) [Creatinine >= 2mg/dL (1 Point)] : Creatinine >= 2mg/dL - No (0) [Insulin-dependent Diabetic (1 Point)] : Insulin-dependent Diabetic - No (0) [0] : 0 , RCRI Class: I, Risk of Post-Op Cardiac Complications: 3.9%, 95% CI for Risk Estimate: 2.8% - 5.4% [Patient Optimized for Surgery] : Patient optimized for surgery [FreeTextEntry4] : Can continue meds prior to surgery ([place] thyroid, Losartan in AM) Hold off on starting Contrave until after surgery

## 2024-09-27 NOTE — HISTORY OF PRESENT ILLNESS
[No Pertinent Cardiac History] : no history of aortic stenosis, atrial fibrillation, coronary artery disease, recent myocardial infarction, or implantable device/pacemaker [No Pertinent Pulmonary History] : no history of asthma, COPD, sleep apnea, or smoking [No Adverse Anesthesia Reaction] : no adverse anesthesia reaction in self or family member [Diabetes] : diabetes [Spouse] : spouse [(Patient denies any chest pain, claudication, dyspnea on exertion, orthopnea, palpitations or syncope)] : Patient denies any chest pain, claudication, dyspnea on exertion, orthopnea, palpitations or syncope [Moderate (4-6 METs)] : Moderate (4-6 METs) [Chronic Anticoagulation] : no chronic anticoagulation [Chronic Kidney Disease] : no chronic kidney disease [FreeTextEntry1] : Left carpal Tunnel [FreeTextEntry2] : 10/11/2024 [FreeTextEntry3] : Dr. Peewee Aquino [FreeTextEntry4] : Patient is a 49F with a hx of inflammatory polyarthritis, HTN, hypothyroidism, Migraines, thyroid nodule, presents today for pre-op eval for carpal tunnel release of the left hand Has completed carpal tunnel release on the right with good results

## 2024-09-27 NOTE — RESULTS/DATA
[] : results reviewed [de-identified] : normal [de-identified] : normal [de-identified] : 6/2024 EKG normal sinus rhythm 63 bpm, no ST or T wave changes

## 2024-09-27 NOTE — RESULTS/DATA
[] : results reviewed [de-identified] : normal [de-identified] : normal [de-identified] : 6/2024 EKG normal sinus rhythm 63 bpm, no ST or T wave changes

## 2024-10-01 ENCOUNTER — APPOINTMENT (OUTPATIENT)
Dept: INTERNAL MEDICINE | Facility: CLINIC | Age: 49
End: 2024-10-01
Payer: COMMERCIAL

## 2024-10-01 VITALS
BODY MASS INDEX: 41.62 KG/M2 | HEIGHT: 66 IN | DIASTOLIC BLOOD PRESSURE: 90 MMHG | WEIGHT: 259 LBS | SYSTOLIC BLOOD PRESSURE: 130 MMHG | OXYGEN SATURATION: 98 % | HEART RATE: 82 BPM

## 2024-10-01 DIAGNOSIS — R39.15 URGENCY OF URINATION: ICD-10-CM

## 2024-10-01 DIAGNOSIS — Z00.00 ENCOUNTER FOR GENERAL ADULT MEDICAL EXAMINATION W/OUT ABNORMAL FINDINGS: ICD-10-CM

## 2024-10-01 PROCEDURE — 99396 PREV VISIT EST AGE 40-64: CPT

## 2024-10-01 RX ORDER — BUPROPION HYDROCHLORIDE 150 MG/1
150 TABLET, EXTENDED RELEASE ORAL
Refills: 0 | Status: ACTIVE | COMMUNITY

## 2024-10-01 NOTE — HEALTH RISK ASSESSMENT
[Fair] :  ~his/her~ mood as fair [Yes] : Yes [2 - 3 times a week (3 pts)] : 2 - 3  times a week (3 points) [1 or 2 (0 pts)] : 1 or 2 (0 points) [Never (0 pts)] : Never (0 points) [No] : In the past 12 months have you used drugs other than those required for medical reasons? No [No falls in past year] : Patient reported no falls in the past year [0] : 2) Feeling down, depressed, or hopeless: Not at all (0) [PHQ-2 Negative - No further assessment needed] : PHQ-2 Negative - No further assessment needed [Former] : Former [> 15 Years] : > 15 Years [NO] : No [Patient reported mammogram was normal] : Patient reported mammogram was normal [Patient reported PAP Smear was normal] : Patient reported PAP Smear was normal [Patient reported bone density results were normal] : Patient reported bone density results were normal [Patient reported colonoscopy was normal] : Patient reported colonoscopy was normal [HIV test declined] : HIV test declined [Hepatitis C test declined] : Hepatitis C test declined [With Family] : lives with family [# of Members in Household ___] :  household currently consist of [unfilled] member(s) [Employed] : employed [Graduate School] : graduate school [] :  [# Of Children ___] : has [unfilled] children [Feels Safe at Home] : Feels safe at home [Smoke Detector] : smoke detector [Carbon Monoxide Detector] : carbon monoxide detector [Seat Belt] :  uses seat belt [Sunscreen] : uses sunscreen [de-identified] : socially  [Audit-CScore] : 3 [de-identified] : Walks [de-identified] : Gluten free [CTN8Armim] : 0 [de-identified] : 1 pack a day for 18 years  [Change in mental status noted] : No change in mental status noted [Language] : denies difficulty with language [Behavior] : denies difficulty with behavior [Learning/Retaining New Information] : denies difficulty learning/retaining new information [Handling Complex Tasks] : denies difficulty handling complex tasks [Reasoning] : denies difficulty with reasoning [Spatial Ability and Orientation] : denies difficulty with spatial ability and orientation [Reports changes in hearing] : Reports no changes in hearing [Reports changes in vision] : Reports no changes in vision [Reports normal functional visual acuity (ie: able to read med bottle)] : Reports poor functional visual acuity.  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

## 2024-10-01 NOTE — HISTORY OF PRESENT ILLNESS
[de-identified] : Patient is a 49F with a hx of inflammatory polyarthritis, Marinelli's esophagus, HTN, hypothyroidism, Migraines, thyroid nodule, presents today for annual exam Had a UTI last week that was treated with Macrobid still with urinary urgency, but pain is resolved Psychiatrist: Dr. Daniel

## 2024-10-02 ENCOUNTER — APPOINTMENT (OUTPATIENT)
Dept: GASTROENTEROLOGY | Facility: CLINIC | Age: 49
End: 2024-10-02
Payer: COMMERCIAL

## 2024-10-02 VITALS
HEART RATE: 72 BPM | SYSTOLIC BLOOD PRESSURE: 130 MMHG | OXYGEN SATURATION: 95 % | HEIGHT: 66 IN | WEIGHT: 259 LBS | DIASTOLIC BLOOD PRESSURE: 78 MMHG | BODY MASS INDEX: 41.62 KG/M2

## 2024-10-02 DIAGNOSIS — K21.9 GASTRO-ESOPHAGEAL REFLUX DISEASE W/OUT ESOPHAGITIS: ICD-10-CM

## 2024-10-02 DIAGNOSIS — K22.70 BARRETT'S ESOPHAGUS W/OUT DYSPLASIA: ICD-10-CM

## 2024-10-02 PROCEDURE — G2211 COMPLEX E/M VISIT ADD ON: CPT | Mod: NC

## 2024-10-02 PROCEDURE — 99213 OFFICE O/P EST LOW 20 MIN: CPT

## 2024-10-02 RX ORDER — PANTOPRAZOLE 40 MG/1
40 TABLET, DELAYED RELEASE ORAL
Qty: 60 | Refills: 6 | Status: ACTIVE | COMMUNITY
Start: 2024-10-02 | End: 1900-01-01

## 2024-10-04 PROBLEM — K22.70 BARRETT'S ESOPHAGUS WITHOUT DYSPLASIA: Status: ACTIVE | Noted: 2024-10-04

## 2024-10-04 NOTE — HISTORY OF PRESENT ILLNESS
[FreeTextEntry1] : 49 year F htn, hypothyroidism, depression, inflammatory joint pain, b12 deficiency on oral supplement, presents for follow up of GERD  EGD/Colonoscopy 12/2022: EGD path notable for esophagitis, focal IM c/w Marinelli's esophagus. Recommend daily PPI, repeat EGD in 3 months for Marinelli's biopsies. Pathology confirmed benign colon polyps, recall for colonoscopy in 3 years EGD 04/2023:Spoke with patient. reviewed results. Pathology confirmed c/w SS BE, no dysplasia. patient feeling well on daily PPI- continue. recall for EGD in 5 years  Pathology confirmed benign colon polyps, recall for colonoscopy in 3 years  she is on Pantoprazole 40 mg daily at night before bed reflux started to recurr in the spring 2024. 2-3 x per week heartburn-(not as severe as prior ) waking up with it in the morning. can occur sporadically.  she gained 10 lbs in July while on vacation no red meat, gluten free diet.  no abd pain, bm normal.  no tick bites advil 1x per week at most etoh 1-2 x per week -2 glasses of wine carpal tunnel surgery scheduled next week considering weight loss medication  prior hx heartburn a few times a week, takes H2B PRN. she has had this "forever" joint pain is better when gluten free.  she is not a vegetarian , she has not eaten red meat for 16 years.   No prior EGD/colonoscopy  Patient denies abdominal pain , n/v/ dysphagia/odynophagia, change in bowel habits, diarrhea, constipation, brbpr, melena. no weight loss.  Good appetite. Patient has daily BM, denies regular NSAID use.    fam hx- negative for colon polyps, colon cancer

## 2024-10-04 NOTE — ASSESSMENT
[FreeTextEntry1] : acute on chronic GERD, while on daily ppi -reviewed diet.lifestyle -change ppi prior to dinner and start am dose EGD to r/o esophagitis Risks (including but not limited to sedation risks, infection, bleeding, perforation), benefits and alternatives to procedure, including not doing the procedure, were discussed with patient. Patient understood and agreed to proceed with EGD EGD preparation instructions reviewed with patient.  pre/probiotics helpful with bloating. -cont

## 2024-10-12 ENCOUNTER — LABORATORY RESULT (OUTPATIENT)
Age: 49
End: 2024-10-12

## 2024-10-15 ENCOUNTER — APPOINTMENT (OUTPATIENT)
Dept: ENDOCRINOLOGY | Facility: CLINIC | Age: 49
End: 2024-10-15

## 2024-10-15 DIAGNOSIS — E04.1 NONTOXIC SINGLE THYROID NODULE: ICD-10-CM

## 2024-10-15 DIAGNOSIS — E78.5 HYPERLIPIDEMIA, UNSPECIFIED: ICD-10-CM

## 2024-10-15 DIAGNOSIS — E66.9 OBESITY, UNSPECIFIED: ICD-10-CM

## 2024-10-15 DIAGNOSIS — R79.89 OTHER SPECIFIED ABNORMAL FINDINGS OF BLOOD CHEMISTRY: ICD-10-CM

## 2024-10-15 DIAGNOSIS — N95.1 MENOPAUSAL AND FEMALE CLIMACTERIC STATES: ICD-10-CM

## 2024-10-15 DIAGNOSIS — R73.03 PREDIABETES.: ICD-10-CM

## 2024-10-15 DIAGNOSIS — E55.9 VITAMIN D DEFICIENCY, UNSPECIFIED: ICD-10-CM

## 2024-10-15 DIAGNOSIS — E03.9 HYPOTHYROIDISM, UNSPECIFIED: ICD-10-CM

## 2024-10-15 DIAGNOSIS — E53.8 DEFICIENCY OF OTHER SPECIFIED B GROUP VITAMINS: ICD-10-CM

## 2024-10-15 PROCEDURE — 99215 OFFICE O/P EST HI 40 MIN: CPT

## 2024-10-15 PROCEDURE — 99401 PREV MED CNSL INDIV APPRX 15: CPT | Mod: 95,59

## 2024-10-15 PROCEDURE — G2211 COMPLEX E/M VISIT ADD ON: CPT | Mod: NC

## 2024-10-15 PROCEDURE — G0447 BEHAVIOR COUNSEL OBESITY 15M: CPT | Mod: 95,59

## 2024-12-09 ENCOUNTER — RESULT REVIEW (OUTPATIENT)
Age: 49
End: 2024-12-09

## 2024-12-09 ENCOUNTER — APPOINTMENT (OUTPATIENT)
Dept: GASTROENTEROLOGY | Facility: HOSPITAL | Age: 49
End: 2024-12-09

## 2024-12-19 ENCOUNTER — NON-APPOINTMENT (OUTPATIENT)
Age: 49
End: 2024-12-19

## 2024-12-25 PROBLEM — F10.90 ALCOHOL USE: Status: ACTIVE | Noted: 2018-11-15

## 2025-01-04 ENCOUNTER — RX RENEWAL (OUTPATIENT)
Age: 50
End: 2025-01-04

## 2025-01-28 ENCOUNTER — LABORATORY RESULT (OUTPATIENT)
Age: 50
End: 2025-01-28

## 2025-01-28 ENCOUNTER — APPOINTMENT (OUTPATIENT)
Dept: ENDOCRINOLOGY | Facility: CLINIC | Age: 50
End: 2025-01-28
Payer: COMMERCIAL

## 2025-01-28 VITALS — HEIGHT: 66 IN | WEIGHT: 252 LBS | BODY MASS INDEX: 40.5 KG/M2

## 2025-01-28 DIAGNOSIS — E66.09 OTHER OBESITY DUE TO EXCESS CALORIES: ICD-10-CM

## 2025-01-28 DIAGNOSIS — R30.0 DYSURIA: ICD-10-CM

## 2025-01-28 DIAGNOSIS — E55.9 VITAMIN D DEFICIENCY, UNSPECIFIED: ICD-10-CM

## 2025-01-28 DIAGNOSIS — E78.5 HYPERLIPIDEMIA, UNSPECIFIED: ICD-10-CM

## 2025-01-28 DIAGNOSIS — E03.9 HYPOTHYROIDISM, UNSPECIFIED: ICD-10-CM

## 2025-01-28 DIAGNOSIS — N95.1 MENOPAUSAL AND FEMALE CLIMACTERIC STATES: ICD-10-CM

## 2025-01-28 DIAGNOSIS — R79.89 OTHER SPECIFIED ABNORMAL FINDINGS OF BLOOD CHEMISTRY: ICD-10-CM

## 2025-01-28 DIAGNOSIS — E04.1 NONTOXIC SINGLE THYROID NODULE: ICD-10-CM

## 2025-01-28 DIAGNOSIS — R73.03 PREDIABETES.: ICD-10-CM

## 2025-01-28 DIAGNOSIS — E66.9 OBESITY, UNSPECIFIED: ICD-10-CM

## 2025-01-28 PROCEDURE — 99215 OFFICE O/P EST HI 40 MIN: CPT | Mod: 95

## 2025-01-28 PROCEDURE — G2211 COMPLEX E/M VISIT ADD ON: CPT | Mod: NC

## 2025-01-28 RX ORDER — THYROID, PORCINE 15 MG/1
15 TABLET ORAL
Qty: 45 | Refills: 3 | Status: ACTIVE | COMMUNITY
Start: 2025-01-28 | End: 1900-01-01

## 2025-01-28 RX ORDER — SULFAMETHOXAZOLE AND TRIMETHOPRIM 800; 160 MG/1; MG/1
800-160 TABLET ORAL TWICE DAILY
Qty: 10 | Refills: 0 | Status: ACTIVE | COMMUNITY
Start: 2025-01-28 | End: 1900-01-01

## 2025-01-29 ENCOUNTER — LABORATORY RESULT (OUTPATIENT)
Age: 50
End: 2025-01-29

## 2025-01-29 LAB
APPEARANCE: CLEAR
BILIRUBIN URINE: NEGATIVE
BLOOD URINE: NEGATIVE
COLOR: YELLOW
GLUCOSE QUALITATIVE U: NEGATIVE MG/DL
KETONES URINE: NEGATIVE MG/DL
LEUKOCYTE ESTERASE URINE: ABNORMAL
NITRITE URINE: NEGATIVE
PH URINE: 6.5
PROTEIN URINE: NEGATIVE MG/DL
SPECIFIC GRAVITY URINE: 1
UROBILINOGEN URINE: 0.2 MG/DL

## 2025-02-06 ENCOUNTER — APPOINTMENT (OUTPATIENT)
Dept: OBGYN | Facility: CLINIC | Age: 50
End: 2025-02-06
Payer: COMMERCIAL

## 2025-02-06 PROCEDURE — 81003 URINALYSIS AUTO W/O SCOPE: CPT | Mod: QW

## 2025-02-07 LAB
APPEARANCE: CLEAR
BILIRUBIN URINE: NEGATIVE
BLOOD URINE: NEGATIVE
COLOR: YELLOW
GLUCOSE QUALITATIVE U: NEGATIVE
KETONES URINE: NEGATIVE
LEUKOCYTE ESTERASE URINE: NEGATIVE
NITRITE URINE: NEGATIVE
PH URINE: 6
PROTEIN URINE: NEGATIVE
SPECIFIC GRAVITY URINE: >=1.03
UROBILINOGEN URINE: 0.2 (ref 0.2–?)

## 2025-02-11 LAB — BACTERIA UR CULT: NORMAL

## 2025-02-13 ENCOUNTER — NON-APPOINTMENT (OUTPATIENT)
Age: 50
End: 2025-02-13

## 2025-02-13 ENCOUNTER — APPOINTMENT (OUTPATIENT)
Dept: INTERNAL MEDICINE | Facility: CLINIC | Age: 50
End: 2025-02-13
Payer: COMMERCIAL

## 2025-02-13 VITALS
HEIGHT: 66 IN | DIASTOLIC BLOOD PRESSURE: 80 MMHG | SYSTOLIC BLOOD PRESSURE: 120 MMHG | BODY MASS INDEX: 42.91 KG/M2 | HEART RATE: 78 BPM | OXYGEN SATURATION: 98 % | WEIGHT: 267 LBS

## 2025-02-13 DIAGNOSIS — Z01.818 ENCOUNTER FOR OTHER PREPROCEDURAL EXAMINATION: ICD-10-CM

## 2025-02-13 DIAGNOSIS — E78.5 HYPERLIPIDEMIA, UNSPECIFIED: ICD-10-CM

## 2025-02-13 DIAGNOSIS — E03.9 HYPOTHYROIDISM, UNSPECIFIED: ICD-10-CM

## 2025-02-13 DIAGNOSIS — M16.9 OSTEOARTHRITIS OF HIP, UNSPECIFIED: ICD-10-CM

## 2025-02-13 PROCEDURE — 99213 OFFICE O/P EST LOW 20 MIN: CPT

## 2025-02-13 PROCEDURE — 93000 ELECTROCARDIOGRAM COMPLETE: CPT

## 2025-02-13 PROCEDURE — 36415 COLL VENOUS BLD VENIPUNCTURE: CPT

## 2025-02-15 PROBLEM — M16.9 OSTEOARTHRITIS OF HIP: Status: ACTIVE | Noted: 2025-02-15

## 2025-02-15 LAB
ALBUMIN SERPL ELPH-MCNC: 4.2 G/DL
ALP BLD-CCNC: 88 U/L
ALT SERPL-CCNC: 22 U/L
ANION GAP SERPL CALC-SCNC: 10 MMOL/L
AST SERPL-CCNC: 19 U/L
BASOPHILS # BLD AUTO: 0.06 K/UL
BASOPHILS NFR BLD AUTO: 1.1 %
BILIRUB SERPL-MCNC: 0.6 MG/DL
BUN SERPL-MCNC: 10 MG/DL
CALCIUM SERPL-MCNC: 9.7 MG/DL
CHLORIDE SERPL-SCNC: 101 MMOL/L
CO2 SERPL-SCNC: 28 MMOL/L
CREAT SERPL-MCNC: 0.82 MG/DL
EGFR: 87 ML/MIN/1.73M2
EOSINOPHIL # BLD AUTO: 0.07 K/UL
EOSINOPHIL NFR BLD AUTO: 1.3 %
GLUCOSE SERPL-MCNC: 97 MG/DL
HCT VFR BLD CALC: 40.1 %
HGB BLD-MCNC: 12.5 G/DL
IMM GRANULOCYTES NFR BLD AUTO: 0.2 %
LYMPHOCYTES # BLD AUTO: 1.79 K/UL
LYMPHOCYTES NFR BLD AUTO: 33.8 %
MAN DIFF?: NORMAL
MCHC RBC-ENTMCNC: 25.1 PG
MCHC RBC-ENTMCNC: 31.2 G/DL
MCV RBC AUTO: 80.5 FL
MONOCYTES # BLD AUTO: 0.53 K/UL
MONOCYTES NFR BLD AUTO: 10 %
NEUTROPHILS # BLD AUTO: 2.83 K/UL
NEUTROPHILS NFR BLD AUTO: 53.6 %
PLATELET # BLD AUTO: 328 K/UL
POTASSIUM SERPL-SCNC: 4.5 MMOL/L
PROT SERPL-MCNC: 6.9 G/DL
RBC # BLD: 4.98 M/UL
RBC # FLD: 15.7 %
SODIUM SERPL-SCNC: 139 MMOL/L
WBC # FLD AUTO: 5.29 K/UL

## 2025-04-09 ENCOUNTER — APPOINTMENT (OUTPATIENT)
Dept: BARIATRICS | Facility: CLINIC | Age: 50
End: 2025-04-09

## 2025-04-11 ENCOUNTER — APPOINTMENT (OUTPATIENT)
Dept: BARIATRICS/WEIGHT MGMT | Facility: CLINIC | Age: 50
End: 2025-04-11
Payer: COMMERCIAL

## 2025-04-11 DIAGNOSIS — R79.89 OTHER SPECIFIED ABNORMAL FINDINGS OF BLOOD CHEMISTRY: ICD-10-CM

## 2025-04-11 DIAGNOSIS — E78.5 HYPERLIPIDEMIA, UNSPECIFIED: ICD-10-CM

## 2025-04-11 DIAGNOSIS — R73.03 PREDIABETES.: ICD-10-CM

## 2025-04-11 DIAGNOSIS — E03.9 HYPOTHYROIDISM, UNSPECIFIED: ICD-10-CM

## 2025-04-11 PROCEDURE — 99213 OFFICE O/P EST LOW 20 MIN: CPT | Mod: 95

## 2025-04-11 RX ORDER — METFORMIN ER 500 MG 500 MG/1
500 TABLET ORAL DAILY
Qty: 120 | Refills: 2 | Status: ACTIVE | COMMUNITY
Start: 2025-04-11 | End: 1900-01-01

## 2025-04-18 ENCOUNTER — APPOINTMENT (OUTPATIENT)
Dept: ENDOCRINOLOGY | Facility: CLINIC | Age: 50
End: 2025-04-18

## 2025-06-26 ENCOUNTER — APPOINTMENT (OUTPATIENT)
Dept: OBGYN | Facility: CLINIC | Age: 50
End: 2025-06-26
Payer: COMMERCIAL

## 2025-06-26 PROBLEM — Z87.898 HISTORY OF URINARY URGENCY: Status: RESOLVED | Noted: 2024-10-01 | Resolved: 2025-06-26

## 2025-06-26 PROBLEM — R39.15 URINARY URGENCY: Status: ACTIVE | Noted: 2025-06-26

## 2025-06-26 PROCEDURE — 81003 URINALYSIS AUTO W/O SCOPE: CPT | Mod: QW

## 2025-06-29 PROBLEM — N30.01 ACUTE CYSTITIS WITH HEMATURIA: Status: ACTIVE | Noted: 2025-06-29

## 2025-06-29 LAB
APPEARANCE: CLEAR
BILIRUBIN URINE: NEGATIVE
BLOOD URINE: ABNORMAL
COLOR: YELLOW
GLUCOSE QUALITATIVE U: NEGATIVE
KETONES URINE: NEGATIVE
LEUKOCYTE ESTERASE URINE: ABNORMAL
NITRITE URINE: NEGATIVE
PH URINE: 7
PROTEIN URINE: NEGATIVE
SPECIFIC GRAVITY URINE: 1.01
UROBILINOGEN URINE: 0.2 (ref 0.2–?)

## 2025-06-30 ENCOUNTER — RX RENEWAL (OUTPATIENT)
Age: 50
End: 2025-06-30

## 2025-07-03 LAB — BACTERIA UR CULT: ABNORMAL

## 2025-07-14 LAB — T4 FREE SERPL-MCNC: 0.9 NG/DL

## 2025-07-15 ENCOUNTER — APPOINTMENT (OUTPATIENT)
Dept: INTERNAL MEDICINE | Facility: CLINIC | Age: 50
End: 2025-07-15
Payer: COMMERCIAL

## 2025-07-15 VITALS
TEMPERATURE: 97.8 F | WEIGHT: 247 LBS | HEIGHT: 66 IN | SYSTOLIC BLOOD PRESSURE: 114 MMHG | BODY MASS INDEX: 39.7 KG/M2 | DIASTOLIC BLOOD PRESSURE: 84 MMHG | RESPIRATION RATE: 16 BRPM | HEART RATE: 75 BPM | OXYGEN SATURATION: 96 %

## 2025-07-15 PROBLEM — M79.672 LEFT FOOT PAIN: Status: ACTIVE | Noted: 2025-07-15

## 2025-07-15 PROCEDURE — 99214 OFFICE O/P EST MOD 30 MIN: CPT

## 2025-07-15 PROCEDURE — G2211 COMPLEX E/M VISIT ADD ON: CPT | Mod: NC

## 2025-07-16 ENCOUNTER — APPOINTMENT (OUTPATIENT)
Dept: ENDOCRINOLOGY | Facility: CLINIC | Age: 50
End: 2025-07-16
Payer: COMMERCIAL

## 2025-07-16 VITALS
SYSTOLIC BLOOD PRESSURE: 108 MMHG | BODY MASS INDEX: 40.02 KG/M2 | OXYGEN SATURATION: 96 % | WEIGHT: 249 LBS | DIASTOLIC BLOOD PRESSURE: 84 MMHG | HEIGHT: 66 IN | HEART RATE: 84 BPM

## 2025-07-16 LAB
25(OH)D3 SERPL-MCNC: 34.5 NG/ML
ALBUMIN SERPL ELPH-MCNC: 4.1 G/DL
ALP BLD-CCNC: 114 U/L
ALT SERPL-CCNC: 43 U/L
ANION GAP SERPL CALC-SCNC: 13 MMOL/L
AST SERPL-CCNC: 25 U/L
BILIRUB SERPL-MCNC: 0.6 MG/DL
BUN SERPL-MCNC: 13 MG/DL
CALCIUM SERPL-MCNC: 10.2 MG/DL
CHLORIDE SERPL-SCNC: 102 MMOL/L
CHOLEST SERPL-MCNC: 196 MG/DL
CO2 SERPL-SCNC: 26 MMOL/L
CREAT SERPL-MCNC: 0.86 MG/DL
EGFRCR SERPLBLD CKD-EPI 2021: 82 ML/MIN/1.73M2
ESTIMATED AVERAGE GLUCOSE: 131 MG/DL
GLUCOSE SERPL-MCNC: 108 MG/DL
HBA1C MFR BLD HPLC: 6.2 %
HDLC SERPL-MCNC: 52 MG/DL
LDLC SERPL-MCNC: 119 MG/DL
NONHDLC SERPL-MCNC: 144 MG/DL
POTASSIUM SERPL-SCNC: 5.1 MMOL/L
PROT SERPL-MCNC: 7.3 G/DL
SODIUM SERPL-SCNC: 141 MMOL/L
TRIGL SERPL-MCNC: 141 MG/DL
TSH SERPL-ACNC: 4.03 UIU/ML

## 2025-07-16 PROCEDURE — 99215 OFFICE O/P EST HI 40 MIN: CPT

## 2025-07-16 PROCEDURE — G2211 COMPLEX E/M VISIT ADD ON: CPT | Mod: NC

## 2025-07-17 ENCOUNTER — APPOINTMENT (OUTPATIENT)
Dept: OBGYN | Facility: CLINIC | Age: 50
End: 2025-07-17

## 2025-07-17 VITALS
SYSTOLIC BLOOD PRESSURE: 143 MMHG | BODY MASS INDEX: 40.02 KG/M2 | HEIGHT: 66 IN | WEIGHT: 249 LBS | DIASTOLIC BLOOD PRESSURE: 96 MMHG

## 2025-07-17 PROBLEM — N89.8 VAGINAL ITCHING: Status: ACTIVE | Noted: 2025-07-17

## 2025-07-17 LAB
CANDIDA VAG CYTO: NOT DETECTED
G VAGINALIS+PREV SP MTYP VAG QL MICRO: DETECTED
T VAGINALIS VAG QL WET PREP: NOT DETECTED

## 2025-07-17 PROCEDURE — 99212 OFFICE O/P EST SF 10 MIN: CPT

## 2025-07-17 RX ORDER — TERCONAZOLE 4 MG/G
0.4 CREAM VAGINAL DAILY
Qty: 1 | Refills: 1 | Status: ACTIVE | COMMUNITY
Start: 2025-07-17 | End: 1900-01-01

## 2025-07-29 DIAGNOSIS — N76.0 ACUTE VAGINITIS: ICD-10-CM

## 2025-07-29 DIAGNOSIS — B96.89 ACUTE VAGINITIS: ICD-10-CM

## 2025-07-29 RX ORDER — METRONIDAZOLE 7.5 MG/G
0.75 GEL VAGINAL
Qty: 1 | Refills: 0 | Status: ACTIVE | COMMUNITY
Start: 2025-07-29 | End: 1900-01-01

## 2025-08-11 ENCOUNTER — RX RENEWAL (OUTPATIENT)
Age: 50
End: 2025-08-11

## 2025-08-12 ENCOUNTER — RESULT REVIEW (OUTPATIENT)
Age: 50
End: 2025-08-12

## 2025-08-14 ENCOUNTER — RESULT REVIEW (OUTPATIENT)
Age: 50
End: 2025-08-14

## 2025-09-15 ENCOUNTER — APPOINTMENT (OUTPATIENT)
Dept: OBGYN | Facility: CLINIC | Age: 50
End: 2025-09-15